# Patient Record
Sex: MALE | Race: BLACK OR AFRICAN AMERICAN | NOT HISPANIC OR LATINO | URBAN - METROPOLITAN AREA
[De-identification: names, ages, dates, MRNs, and addresses within clinical notes are randomized per-mention and may not be internally consistent; named-entity substitution may affect disease eponyms.]

---

## 2017-01-19 ENCOUNTER — OFFICE VISIT - HEALTHEAST (OUTPATIENT)
Dept: INTERNAL MEDICINE | Facility: CLINIC | Age: 62
End: 2017-01-19

## 2017-01-19 DIAGNOSIS — I63.9 CEREBROVASCULAR ACCIDENT (CVA), UNSPECIFIED MECHANISM (H): ICD-10-CM

## 2017-01-19 DIAGNOSIS — I10 ESSENTIAL HYPERTENSION: ICD-10-CM

## 2017-01-19 DIAGNOSIS — E11.9 DIABETES MELLITUS (H): ICD-10-CM

## 2017-01-19 DIAGNOSIS — E89.0 POSTOPERATIVE HYPOTHYROIDISM: ICD-10-CM

## 2017-01-19 LAB
CHOLEST SERPL-MCNC: 116 MG/DL
FASTING STATUS PATIENT QL REPORTED: YES
HBA1C MFR BLD: 7 % (ref 3.5–6)
HDLC SERPL-MCNC: 39 MG/DL
LDLC SERPL CALC-MCNC: 64 MG/DL
TRIGL SERPL-MCNC: 67 MG/DL

## 2017-01-19 ASSESSMENT — MIFFLIN-ST. JEOR: SCORE: 1536.49

## 2017-01-30 ENCOUNTER — OFFICE VISIT - HEALTHEAST (OUTPATIENT)
Dept: OCCUPATIONAL THERAPY | Facility: REHABILITATION | Age: 62
End: 2017-01-30

## 2017-01-30 DIAGNOSIS — M25.60 STIFFNESS IN JOINT: ICD-10-CM

## 2017-01-30 DIAGNOSIS — M79.641 PAIN IN RIGHT HAND: ICD-10-CM

## 2017-01-30 DIAGNOSIS — Z78.9 DECREASED ACTIVITIES OF DAILY LIVING (ADL): ICD-10-CM

## 2017-02-08 ENCOUNTER — COMMUNICATION - HEALTHEAST (OUTPATIENT)
Dept: INTERNAL MEDICINE | Facility: CLINIC | Age: 62
End: 2017-02-08

## 2017-02-20 ENCOUNTER — OFFICE VISIT - HEALTHEAST (OUTPATIENT)
Dept: OCCUPATIONAL THERAPY | Facility: REHABILITATION | Age: 62
End: 2017-02-20

## 2017-02-20 DIAGNOSIS — M25.60 STIFFNESS IN JOINT: ICD-10-CM

## 2017-02-20 DIAGNOSIS — M79.641 PAIN IN RIGHT HAND: ICD-10-CM

## 2017-02-20 DIAGNOSIS — Z78.9 DECREASED ACTIVITIES OF DAILY LIVING (ADL): ICD-10-CM

## 2017-03-27 ENCOUNTER — COMMUNICATION - HEALTHEAST (OUTPATIENT)
Dept: INTERNAL MEDICINE | Facility: CLINIC | Age: 62
End: 2017-03-27

## 2017-04-19 ENCOUNTER — OFFICE VISIT - HEALTHEAST (OUTPATIENT)
Dept: INTERNAL MEDICINE | Facility: CLINIC | Age: 62
End: 2017-04-19

## 2017-04-19 DIAGNOSIS — I63.9 CEREBROVASCULAR ACCIDENT (CVA), UNSPECIFIED MECHANISM (H): ICD-10-CM

## 2017-04-19 DIAGNOSIS — N40.1 BENIGN PROSTATIC HYPERPLASIA WITH LOWER URINARY TRACT SYMPTOMS, UNSPECIFIED MORPHOLOGY: ICD-10-CM

## 2017-04-19 DIAGNOSIS — E11.9 TYPE 2 DIABETES MELLITUS WITHOUT COMPLICATION, WITHOUT LONG-TERM CURRENT USE OF INSULIN (H): ICD-10-CM

## 2017-04-19 DIAGNOSIS — E89.0 POSTOPERATIVE HYPOTHYROIDISM: ICD-10-CM

## 2017-04-19 DIAGNOSIS — I10 ESSENTIAL HYPERTENSION: ICD-10-CM

## 2017-04-19 LAB — HBA1C MFR BLD: 6.7 % (ref 3.5–6)

## 2017-04-19 ASSESSMENT — MIFFLIN-ST. JEOR: SCORE: 1562.58

## 2017-05-02 ENCOUNTER — COMMUNICATION - HEALTHEAST (OUTPATIENT)
Dept: INTERNAL MEDICINE | Facility: CLINIC | Age: 62
End: 2017-05-02

## 2017-05-02 ENCOUNTER — COMMUNICATION - HEALTHEAST (OUTPATIENT)
Dept: SCHEDULING | Facility: CLINIC | Age: 62
End: 2017-05-02

## 2017-05-02 DIAGNOSIS — J06.9 URI (UPPER RESPIRATORY INFECTION): ICD-10-CM

## 2017-07-20 ENCOUNTER — OFFICE VISIT - HEALTHEAST (OUTPATIENT)
Dept: INTERNAL MEDICINE | Facility: CLINIC | Age: 62
End: 2017-07-20

## 2017-07-20 ENCOUNTER — RECORDS - HEALTHEAST (OUTPATIENT)
Dept: GENERAL RADIOLOGY | Facility: CLINIC | Age: 62
End: 2017-07-20

## 2017-07-20 DIAGNOSIS — I63.9 CEREBROVASCULAR ACCIDENT (CVA), UNSPECIFIED MECHANISM (H): ICD-10-CM

## 2017-07-20 DIAGNOSIS — E11.9 TYPE 2 DIABETES MELLITUS WITHOUT COMPLICATION, WITHOUT LONG-TERM CURRENT USE OF INSULIN (H): ICD-10-CM

## 2017-07-20 DIAGNOSIS — E89.0 POSTOPERATIVE HYPOTHYROIDISM: ICD-10-CM

## 2017-07-20 DIAGNOSIS — R20.0 NUMBNESS OF RIGHT HAND: ICD-10-CM

## 2017-07-20 DIAGNOSIS — M54.2 CERVICALGIA: ICD-10-CM

## 2017-07-20 DIAGNOSIS — M54.2 NECK PAIN: ICD-10-CM

## 2017-07-20 DIAGNOSIS — M25.521 RIGHT ELBOW PAIN: ICD-10-CM

## 2017-07-20 DIAGNOSIS — I10 ESSENTIAL HYPERTENSION: ICD-10-CM

## 2017-07-20 DIAGNOSIS — M25.521 PAIN IN RIGHT ELBOW: ICD-10-CM

## 2017-07-20 LAB
CREAT SERPL-MCNC: 0.99 MG/DL (ref 0.7–1.3)
GFR SERPL CREATININE-BSD FRML MDRD: >60 ML/MIN/1.73M2
HBA1C MFR BLD: 6.7 % (ref 3.5–6)

## 2017-07-20 ASSESSMENT — MIFFLIN-ST. JEOR: SCORE: 1550.1

## 2017-07-28 ENCOUNTER — COMMUNICATION - HEALTHEAST (OUTPATIENT)
Dept: INTERNAL MEDICINE | Facility: CLINIC | Age: 62
End: 2017-07-28

## 2017-08-02 ENCOUNTER — HOSPITAL ENCOUNTER (OUTPATIENT)
Dept: PHYSICAL MEDICINE AND REHAB | Facility: CLINIC | Age: 62
Discharge: HOME | End: 2017-08-02
Attending: INTERNAL MEDICINE

## 2017-08-02 DIAGNOSIS — M25.521 RIGHT ELBOW PAIN: ICD-10-CM

## 2017-08-02 DIAGNOSIS — R20.0 NUMBNESS OF RIGHT HAND: ICD-10-CM

## 2017-08-02 DIAGNOSIS — M54.2 NECK PAIN: ICD-10-CM

## 2017-08-03 ENCOUNTER — OFFICE VISIT - HEALTHEAST (OUTPATIENT)
Dept: INTERNAL MEDICINE | Facility: CLINIC | Age: 62
End: 2017-08-03

## 2017-08-03 DIAGNOSIS — M54.2 NECK PAIN: ICD-10-CM

## 2017-08-03 DIAGNOSIS — M79.601 RIGHT ARM PAIN: ICD-10-CM

## 2017-08-03 DIAGNOSIS — E11.9 TYPE 2 DIABETES MELLITUS WITHOUT COMPLICATION, WITHOUT LONG-TERM CURRENT USE OF INSULIN (H): ICD-10-CM

## 2017-08-03 DIAGNOSIS — I63.9 CEREBROVASCULAR ACCIDENT (CVA), UNSPECIFIED MECHANISM (H): ICD-10-CM

## 2017-08-03 DIAGNOSIS — M48.02 CERVICAL SPINAL STENOSIS: ICD-10-CM

## 2017-08-03 DIAGNOSIS — I10 ESSENTIAL HYPERTENSION: ICD-10-CM

## 2017-08-03 ASSESSMENT — MIFFLIN-ST. JEOR: SCORE: 1545.57

## 2017-08-17 ENCOUNTER — HOSPITAL ENCOUNTER (OUTPATIENT)
Dept: MRI IMAGING | Facility: CLINIC | Age: 62
Discharge: HOME | End: 2017-08-17
Attending: INTERNAL MEDICINE

## 2017-08-17 DIAGNOSIS — M54.2 NECK PAIN: ICD-10-CM

## 2017-08-17 DIAGNOSIS — M79.601 RIGHT ARM PAIN: ICD-10-CM

## 2017-08-18 ENCOUNTER — COMMUNICATION - HEALTHEAST (OUTPATIENT)
Dept: INTERNAL MEDICINE | Facility: CLINIC | Age: 62
End: 2017-08-18

## 2017-08-31 ENCOUNTER — OFFICE VISIT - HEALTHEAST (OUTPATIENT)
Dept: NEUROSURGERY | Facility: CLINIC | Age: 62
End: 2017-08-31

## 2017-08-31 DIAGNOSIS — G54.2 CERVICAL NERVE ROOT COMPRESSION: ICD-10-CM

## 2017-08-31 ASSESSMENT — MIFFLIN-ST. JEOR: SCORE: 1545.57

## 2017-09-14 ENCOUNTER — HOSPITAL ENCOUNTER (OUTPATIENT)
Dept: PHYSICAL MEDICINE AND REHAB | Facility: CLINIC | Age: 62
Discharge: HOME | End: 2017-09-14
Attending: NEUROLOGICAL SURGERY

## 2017-09-14 DIAGNOSIS — G54.2 CERVICAL NERVE ROOT COMPRESSION: ICD-10-CM

## 2017-09-14 DIAGNOSIS — M54.12 RADICULITIS OF LEFT CERVICAL REGION: ICD-10-CM

## 2017-09-14 DIAGNOSIS — E11.9 TYPE 2 DIABETES MELLITUS WITHOUT COMPLICATION, WITHOUT LONG-TERM CURRENT USE OF INSULIN (H): ICD-10-CM

## 2017-09-14 DIAGNOSIS — R20.0 NUMBNESS OF RIGHT HAND: ICD-10-CM

## 2017-09-15 ENCOUNTER — COMMUNICATION - HEALTHEAST (OUTPATIENT)
Dept: PHYSICAL MEDICINE AND REHAB | Facility: CLINIC | Age: 62
End: 2017-09-15

## 2017-09-19 ENCOUNTER — OFFICE VISIT - HEALTHEAST (OUTPATIENT)
Dept: PHYSICAL THERAPY | Facility: REHABILITATION | Age: 62
End: 2017-09-19

## 2017-09-19 DIAGNOSIS — R29.3 ABNORMAL POSTURE: ICD-10-CM

## 2017-09-19 DIAGNOSIS — M54.12 LEFT CERVICAL RADICULOPATHY: ICD-10-CM

## 2017-09-19 DIAGNOSIS — R29.898 DECREASED ROM OF NECK: ICD-10-CM

## 2017-09-19 DIAGNOSIS — M54.12 RIGHT CERVICAL RADICULOPATHY: ICD-10-CM

## 2017-09-21 ENCOUNTER — OFFICE VISIT - HEALTHEAST (OUTPATIENT)
Dept: PHYSICAL THERAPY | Facility: REHABILITATION | Age: 62
End: 2017-09-21

## 2017-09-21 DIAGNOSIS — M54.12 LEFT CERVICAL RADICULOPATHY: ICD-10-CM

## 2017-09-21 DIAGNOSIS — M54.12 RIGHT CERVICAL RADICULOPATHY: ICD-10-CM

## 2017-09-21 DIAGNOSIS — R29.3 ABNORMAL POSTURE: ICD-10-CM

## 2017-09-21 DIAGNOSIS — R29.898 DECREASED ROM OF NECK: ICD-10-CM

## 2017-09-25 ENCOUNTER — OFFICE VISIT - HEALTHEAST (OUTPATIENT)
Dept: PHYSICAL THERAPY | Facility: REHABILITATION | Age: 62
End: 2017-09-25

## 2017-09-25 ENCOUNTER — COMMUNICATION - HEALTHEAST (OUTPATIENT)
Dept: PHYSICAL MEDICINE AND REHAB | Facility: CLINIC | Age: 62
End: 2017-09-25

## 2017-09-25 DIAGNOSIS — R29.3 ABNORMAL POSTURE: ICD-10-CM

## 2017-09-25 DIAGNOSIS — M54.12 RIGHT CERVICAL RADICULOPATHY: ICD-10-CM

## 2017-09-25 DIAGNOSIS — R29.898 DECREASED ROM OF NECK: ICD-10-CM

## 2017-09-25 DIAGNOSIS — M54.12 LEFT CERVICAL RADICULOPATHY: ICD-10-CM

## 2017-09-28 ENCOUNTER — OFFICE VISIT - HEALTHEAST (OUTPATIENT)
Dept: PHYSICAL THERAPY | Facility: REHABILITATION | Age: 62
End: 2017-09-28

## 2017-09-28 DIAGNOSIS — R29.3 ABNORMAL POSTURE: ICD-10-CM

## 2017-09-28 DIAGNOSIS — R29.898 DECREASED ROM OF NECK: ICD-10-CM

## 2017-09-28 DIAGNOSIS — M54.12 RIGHT CERVICAL RADICULOPATHY: ICD-10-CM

## 2017-09-28 DIAGNOSIS — M54.12 LEFT CERVICAL RADICULOPATHY: ICD-10-CM

## 2017-10-02 ENCOUNTER — OFFICE VISIT - HEALTHEAST (OUTPATIENT)
Dept: PHYSICAL THERAPY | Facility: REHABILITATION | Age: 62
End: 2017-10-02

## 2017-10-02 DIAGNOSIS — R29.3 ABNORMAL POSTURE: ICD-10-CM

## 2017-10-02 DIAGNOSIS — M54.12 RIGHT CERVICAL RADICULOPATHY: ICD-10-CM

## 2017-10-02 DIAGNOSIS — M54.12 LEFT CERVICAL RADICULOPATHY: ICD-10-CM

## 2017-10-02 DIAGNOSIS — R29.898 DECREASED ROM OF NECK: ICD-10-CM

## 2017-10-05 ENCOUNTER — OFFICE VISIT - HEALTHEAST (OUTPATIENT)
Dept: PHYSICAL THERAPY | Facility: REHABILITATION | Age: 62
End: 2017-10-05

## 2017-10-05 DIAGNOSIS — M54.12 RIGHT CERVICAL RADICULOPATHY: ICD-10-CM

## 2017-10-05 DIAGNOSIS — R29.898 DECREASED ROM OF NECK: ICD-10-CM

## 2017-10-05 DIAGNOSIS — R29.3 ABNORMAL POSTURE: ICD-10-CM

## 2017-10-09 ENCOUNTER — OFFICE VISIT - HEALTHEAST (OUTPATIENT)
Dept: PHYSICAL THERAPY | Facility: REHABILITATION | Age: 62
End: 2017-10-09

## 2017-10-09 DIAGNOSIS — M54.12 LEFT CERVICAL RADICULOPATHY: ICD-10-CM

## 2017-10-09 DIAGNOSIS — M54.12 RIGHT CERVICAL RADICULOPATHY: ICD-10-CM

## 2017-10-09 DIAGNOSIS — R29.898 DECREASED ROM OF NECK: ICD-10-CM

## 2017-10-09 DIAGNOSIS — R29.3 ABNORMAL POSTURE: ICD-10-CM

## 2017-10-16 ENCOUNTER — OFFICE VISIT - HEALTHEAST (OUTPATIENT)
Dept: PHYSICAL THERAPY | Facility: REHABILITATION | Age: 62
End: 2017-10-16

## 2017-10-16 DIAGNOSIS — M54.12 LEFT CERVICAL RADICULOPATHY: ICD-10-CM

## 2017-10-16 DIAGNOSIS — R29.3 ABNORMAL POSTURE: ICD-10-CM

## 2017-10-16 DIAGNOSIS — M54.12 RIGHT CERVICAL RADICULOPATHY: ICD-10-CM

## 2017-10-16 DIAGNOSIS — R29.898 DECREASED ROM OF NECK: ICD-10-CM

## 2017-10-19 ENCOUNTER — COMMUNICATION - HEALTHEAST (OUTPATIENT)
Dept: PHYSICAL THERAPY | Facility: REHABILITATION | Age: 62
End: 2017-10-19

## 2017-10-23 ENCOUNTER — OFFICE VISIT - HEALTHEAST (OUTPATIENT)
Dept: PHYSICAL THERAPY | Facility: REHABILITATION | Age: 62
End: 2017-10-23

## 2017-10-23 DIAGNOSIS — R29.898 DECREASED ROM OF NECK: ICD-10-CM

## 2017-10-23 DIAGNOSIS — R29.3 ABNORMAL POSTURE: ICD-10-CM

## 2017-10-23 DIAGNOSIS — M54.12 LEFT CERVICAL RADICULOPATHY: ICD-10-CM

## 2017-10-23 DIAGNOSIS — M54.12 RIGHT CERVICAL RADICULOPATHY: ICD-10-CM

## 2017-10-24 ENCOUNTER — OFFICE VISIT - HEALTHEAST (OUTPATIENT)
Dept: INTERNAL MEDICINE | Facility: CLINIC | Age: 62
End: 2017-10-24

## 2017-10-24 DIAGNOSIS — I63.9 CEREBROVASCULAR ACCIDENT (CVA), UNSPECIFIED MECHANISM (H): ICD-10-CM

## 2017-10-24 DIAGNOSIS — E89.0 POSTOPERATIVE HYPOTHYROIDISM: ICD-10-CM

## 2017-10-24 DIAGNOSIS — I10 ESSENTIAL HYPERTENSION: ICD-10-CM

## 2017-10-24 DIAGNOSIS — Z12.11 SCREEN FOR COLON CANCER: ICD-10-CM

## 2017-10-24 DIAGNOSIS — E11.9 TYPE 2 DIABETES MELLITUS WITHOUT COMPLICATION, WITHOUT LONG-TERM CURRENT USE OF INSULIN (H): ICD-10-CM

## 2017-10-24 DIAGNOSIS — Z23 NEED FOR PROPHYLACTIC VACCINATION AND INOCULATION AGAINST INFLUENZA: ICD-10-CM

## 2017-10-24 LAB
CREAT SERPL-MCNC: 0.96 MG/DL (ref 0.7–1.3)
GFR SERPL CREATININE-BSD FRML MDRD: >60 ML/MIN/1.73M2
HBA1C MFR BLD: 6.8 % (ref 3.5–6)

## 2017-10-24 ASSESSMENT — MIFFLIN-ST. JEOR: SCORE: 1545.57

## 2017-10-26 ENCOUNTER — OFFICE VISIT - HEALTHEAST (OUTPATIENT)
Dept: PHYSICAL THERAPY | Facility: REHABILITATION | Age: 62
End: 2017-10-26

## 2017-10-26 DIAGNOSIS — R29.3 ABNORMAL POSTURE: ICD-10-CM

## 2017-10-26 DIAGNOSIS — R29.898 DECREASED ROM OF NECK: ICD-10-CM

## 2017-10-26 DIAGNOSIS — M54.12 LEFT CERVICAL RADICULOPATHY: ICD-10-CM

## 2017-10-26 DIAGNOSIS — M54.12 RIGHT CERVICAL RADICULOPATHY: ICD-10-CM

## 2017-11-02 ENCOUNTER — COMMUNICATION - HEALTHEAST (OUTPATIENT)
Dept: PHYSICAL THERAPY | Facility: REHABILITATION | Age: 62
End: 2017-11-02

## 2017-11-03 ENCOUNTER — AMBULATORY - HEALTHEAST (OUTPATIENT)
Dept: INTERNAL MEDICINE | Facility: CLINIC | Age: 62
End: 2017-11-03

## 2017-11-13 ENCOUNTER — COMMUNICATION - HEALTHEAST (OUTPATIENT)
Dept: INTERNAL MEDICINE | Facility: CLINIC | Age: 62
End: 2017-11-13

## 2017-12-05 ENCOUNTER — COMMUNICATION - HEALTHEAST (OUTPATIENT)
Dept: SCHEDULING | Facility: CLINIC | Age: 62
End: 2017-12-05

## 2017-12-11 ENCOUNTER — RECORDS - HEALTHEAST (OUTPATIENT)
Dept: ADMINISTRATIVE | Facility: OTHER | Age: 62
End: 2017-12-11

## 2017-12-19 ENCOUNTER — COMMUNICATION - HEALTHEAST (OUTPATIENT)
Dept: SCHEDULING | Facility: CLINIC | Age: 62
End: 2017-12-19

## 2018-01-25 ENCOUNTER — OFFICE VISIT - HEALTHEAST (OUTPATIENT)
Dept: INTERNAL MEDICINE | Facility: CLINIC | Age: 63
End: 2018-01-25

## 2018-01-25 DIAGNOSIS — E11.9 TYPE 2 DIABETES MELLITUS WITHOUT COMPLICATION, WITHOUT LONG-TERM CURRENT USE OF INSULIN (H): ICD-10-CM

## 2018-01-25 DIAGNOSIS — I63.9 CEREBROVASCULAR ACCIDENT (CVA), UNSPECIFIED MECHANISM (H): ICD-10-CM

## 2018-01-25 DIAGNOSIS — N40.1 BENIGN PROSTATIC HYPERPLASIA WITH URINARY FREQUENCY: ICD-10-CM

## 2018-01-25 DIAGNOSIS — E89.0 POSTOPERATIVE HYPOTHYROIDISM: ICD-10-CM

## 2018-01-25 DIAGNOSIS — R35.0 BENIGN PROSTATIC HYPERPLASIA WITH URINARY FREQUENCY: ICD-10-CM

## 2018-01-25 DIAGNOSIS — I10 ESSENTIAL HYPERTENSION: ICD-10-CM

## 2018-01-25 LAB
CHOLEST SERPL-MCNC: 119 MG/DL
CREAT SERPL-MCNC: 0.93 MG/DL (ref 0.7–1.3)
FASTING STATUS PATIENT QL REPORTED: YES
GFR SERPL CREATININE-BSD FRML MDRD: >60 ML/MIN/1.73M2
HBA1C MFR BLD: 6.6 % (ref 3.5–6)
HDLC SERPL-MCNC: 46 MG/DL
LDLC SERPL CALC-MCNC: 59 MG/DL
POTASSIUM BLD-SCNC: 4 MMOL/L (ref 3.5–5)
TRIGL SERPL-MCNC: 72 MG/DL
TSH SERPL DL<=0.005 MIU/L-ACNC: 2.36 UIU/ML (ref 0.3–5)

## 2018-01-25 ASSESSMENT — MIFFLIN-ST. JEOR: SCORE: 1554.64

## 2018-03-12 ENCOUNTER — AMBULATORY - HEALTHEAST (OUTPATIENT)
Dept: INTERNAL MEDICINE | Facility: CLINIC | Age: 63
End: 2018-03-12

## 2018-03-12 ENCOUNTER — COMMUNICATION - HEALTHEAST (OUTPATIENT)
Dept: INTERNAL MEDICINE | Facility: CLINIC | Age: 63
End: 2018-03-12

## 2018-04-25 ENCOUNTER — OFFICE VISIT - HEALTHEAST (OUTPATIENT)
Dept: INTERNAL MEDICINE | Facility: CLINIC | Age: 63
End: 2018-04-25

## 2018-04-25 DIAGNOSIS — E89.0 POSTOPERATIVE HYPOTHYROIDISM: ICD-10-CM

## 2018-04-25 DIAGNOSIS — R35.0 BENIGN PROSTATIC HYPERPLASIA WITH URINARY FREQUENCY: ICD-10-CM

## 2018-04-25 DIAGNOSIS — D17.0 LIPOMA OF NECK: ICD-10-CM

## 2018-04-25 DIAGNOSIS — J31.0 RHINITIS, UNSPECIFIED CHRONICITY, UNSPECIFIED TYPE: ICD-10-CM

## 2018-04-25 DIAGNOSIS — I63.9 CEREBROVASCULAR ACCIDENT (CVA), UNSPECIFIED MECHANISM (H): ICD-10-CM

## 2018-04-25 DIAGNOSIS — I10 ESSENTIAL HYPERTENSION: ICD-10-CM

## 2018-04-25 DIAGNOSIS — N40.1 BENIGN PROSTATIC HYPERPLASIA WITH URINARY FREQUENCY: ICD-10-CM

## 2018-04-25 DIAGNOSIS — E11.9 TYPE 2 DIABETES MELLITUS WITHOUT COMPLICATION, WITHOUT LONG-TERM CURRENT USE OF INSULIN (H): ICD-10-CM

## 2018-04-25 LAB — HBA1C MFR BLD: 7.1 % (ref 3.5–6)

## 2018-04-25 ASSESSMENT — MIFFLIN-ST. JEOR: SCORE: 1563.71

## 2018-05-15 ENCOUNTER — COMMUNICATION - HEALTHEAST (OUTPATIENT)
Dept: INTERNAL MEDICINE | Facility: CLINIC | Age: 63
End: 2018-05-15

## 2018-05-16 ENCOUNTER — COMMUNICATION - HEALTHEAST (OUTPATIENT)
Dept: INTERNAL MEDICINE | Facility: CLINIC | Age: 63
End: 2018-05-16

## 2018-05-16 DIAGNOSIS — I10 HTN (HYPERTENSION): ICD-10-CM

## 2018-05-23 ENCOUNTER — COMMUNICATION - HEALTHEAST (OUTPATIENT)
Dept: SCHEDULING | Facility: CLINIC | Age: 63
End: 2018-05-23

## 2018-05-23 ENCOUNTER — OFFICE VISIT - HEALTHEAST (OUTPATIENT)
Dept: INTERNAL MEDICINE | Facility: CLINIC | Age: 63
End: 2018-05-23

## 2018-05-23 DIAGNOSIS — R60.9 PITTING EDEMA: ICD-10-CM

## 2018-05-23 DIAGNOSIS — E89.0 POSTOPERATIVE HYPOTHYROIDISM: ICD-10-CM

## 2018-05-23 DIAGNOSIS — N40.1 BENIGN PROSTATIC HYPERPLASIA WITH URINARY FREQUENCY: ICD-10-CM

## 2018-05-23 DIAGNOSIS — R35.0 BENIGN PROSTATIC HYPERPLASIA WITH URINARY FREQUENCY: ICD-10-CM

## 2018-05-23 DIAGNOSIS — I10 ESSENTIAL HYPERTENSION: ICD-10-CM

## 2018-05-23 ASSESSMENT — MIFFLIN-ST. JEOR: SCORE: 1577.32

## 2018-06-19 ENCOUNTER — RECORDS - HEALTHEAST (OUTPATIENT)
Dept: ADMINISTRATIVE | Facility: OTHER | Age: 63
End: 2018-06-19

## 2018-07-11 ENCOUNTER — AMBULATORY - HEALTHEAST (OUTPATIENT)
Dept: INTERNAL MEDICINE | Facility: CLINIC | Age: 63
End: 2018-07-11

## 2018-07-31 ENCOUNTER — OFFICE VISIT - HEALTHEAST (OUTPATIENT)
Dept: INTERNAL MEDICINE | Facility: CLINIC | Age: 63
End: 2018-07-31

## 2018-07-31 DIAGNOSIS — I63.9 CEREBROVASCULAR ACCIDENT (CVA), UNSPECIFIED MECHANISM (H): ICD-10-CM

## 2018-07-31 DIAGNOSIS — D64.9 ANEMIA, UNSPECIFIED TYPE: ICD-10-CM

## 2018-07-31 DIAGNOSIS — E89.0 POSTOPERATIVE HYPOTHYROIDISM: ICD-10-CM

## 2018-07-31 DIAGNOSIS — Z12.11 ENCOUNTER FOR SCREENING COLONOSCOPY: ICD-10-CM

## 2018-07-31 DIAGNOSIS — I10 ESSENTIAL HYPERTENSION: ICD-10-CM

## 2018-07-31 DIAGNOSIS — E11.59 TYPE 2 DIABETES MELLITUS WITH CIRCULATORY DISORDER, WITHOUT LONG-TERM CURRENT USE OF INSULIN (H): ICD-10-CM

## 2018-07-31 LAB
ANION GAP SERPL CALCULATED.3IONS-SCNC: 12 MMOL/L (ref 5–18)
BUN SERPL-MCNC: 13 MG/DL (ref 8–22)
CALCIUM SERPL-MCNC: 9.7 MG/DL (ref 8.5–10.5)
CHLORIDE BLD-SCNC: 103 MMOL/L (ref 98–107)
CO2 SERPL-SCNC: 25 MMOL/L (ref 22–31)
CREAT SERPL-MCNC: 0.95 MG/DL (ref 0.7–1.3)
ERYTHROCYTE [DISTWIDTH] IN BLOOD BY AUTOMATED COUNT: 13.5 % (ref 11–14.5)
FERRITIN SERPL-MCNC: 128 NG/ML (ref 27–300)
FOLATE SERPL-MCNC: 8.5 NG/ML
GFR SERPL CREATININE-BSD FRML MDRD: >60 ML/MIN/1.73M2
GLUCOSE BLD-MCNC: 113 MG/DL (ref 70–125)
HBA1C MFR BLD: 6.9 % (ref 3.5–6)
HCT VFR BLD AUTO: 39.4 % (ref 40–54)
HGB BLD-MCNC: 12.9 G/DL (ref 14–18)
MCH RBC QN AUTO: 27.9 PG (ref 27–34)
MCHC RBC AUTO-ENTMCNC: 32.7 G/DL (ref 32–36)
MCV RBC AUTO: 85 FL (ref 80–100)
PLATELET # BLD AUTO: 257 THOU/UL (ref 140–440)
PMV BLD AUTO: 7.4 FL (ref 7–10)
POTASSIUM BLD-SCNC: 3.9 MMOL/L (ref 3.5–5)
RBC # BLD AUTO: 4.62 MILL/UL (ref 4.4–6.2)
SODIUM SERPL-SCNC: 140 MMOL/L (ref 136–145)
TSH SERPL DL<=0.005 MIU/L-ACNC: 0.62 UIU/ML (ref 0.3–5)
VIT B12 SERPL-MCNC: 739 PG/ML (ref 213–816)
WBC: 5.9 THOU/UL (ref 4–11)

## 2018-07-31 ASSESSMENT — MIFFLIN-ST. JEOR: SCORE: 1550.1

## 2018-08-28 ENCOUNTER — COMMUNICATION - HEALTHEAST (OUTPATIENT)
Dept: INTERNAL MEDICINE | Facility: CLINIC | Age: 63
End: 2018-08-28

## 2018-10-31 ENCOUNTER — OFFICE VISIT - HEALTHEAST (OUTPATIENT)
Dept: INTERNAL MEDICINE | Facility: CLINIC | Age: 63
End: 2018-10-31

## 2018-10-31 ENCOUNTER — RECORDS - HEALTHEAST (OUTPATIENT)
Dept: GENERAL RADIOLOGY | Facility: CLINIC | Age: 63
End: 2018-10-31

## 2018-10-31 DIAGNOSIS — E11.59 TYPE 2 DIABETES MELLITUS WITH CIRCULATORY DISORDER, WITHOUT LONG-TERM CURRENT USE OF INSULIN (H): ICD-10-CM

## 2018-10-31 DIAGNOSIS — M25.562 LEFT KNEE PAIN: ICD-10-CM

## 2018-10-31 DIAGNOSIS — R35.0 BENIGN PROSTATIC HYPERPLASIA WITH URINARY FREQUENCY: ICD-10-CM

## 2018-10-31 DIAGNOSIS — M25.562 PAIN IN LEFT KNEE: ICD-10-CM

## 2018-10-31 DIAGNOSIS — Z23 NEED FOR PROPHYLACTIC VACCINATION AND INOCULATION AGAINST INFLUENZA: ICD-10-CM

## 2018-10-31 DIAGNOSIS — I10 ESSENTIAL HYPERTENSION: ICD-10-CM

## 2018-10-31 DIAGNOSIS — E89.0 POSTOPERATIVE HYPOTHYROIDISM: ICD-10-CM

## 2018-10-31 DIAGNOSIS — D64.9 ANEMIA: ICD-10-CM

## 2018-10-31 DIAGNOSIS — Z12.5 SCREENING FOR PROSTATE CANCER: ICD-10-CM

## 2018-10-31 DIAGNOSIS — N40.1 BENIGN PROSTATIC HYPERPLASIA WITH URINARY FREQUENCY: ICD-10-CM

## 2018-10-31 LAB
ANION GAP SERPL CALCULATED.3IONS-SCNC: 12 MMOL/L (ref 5–18)
BUN SERPL-MCNC: 14 MG/DL (ref 8–22)
CALCIUM SERPL-MCNC: 9.6 MG/DL (ref 8.5–10.5)
CHLORIDE BLD-SCNC: 102 MMOL/L (ref 98–107)
CO2 SERPL-SCNC: 25 MMOL/L (ref 22–31)
CREAT SERPL-MCNC: 1.04 MG/DL (ref 0.7–1.3)
ERYTHROCYTE [DISTWIDTH] IN BLOOD BY AUTOMATED COUNT: 13.5 % (ref 11–14.5)
GFR SERPL CREATININE-BSD FRML MDRD: >60 ML/MIN/1.73M2
GLUCOSE BLD-MCNC: 86 MG/DL (ref 70–125)
HBA1C MFR BLD: 6.9 % (ref 3.5–6)
HCT VFR BLD AUTO: 36.8 % (ref 40–54)
HGB BLD-MCNC: 12.3 G/DL (ref 14–18)
MCH RBC QN AUTO: 28.3 PG (ref 27–34)
MCHC RBC AUTO-ENTMCNC: 33.4 G/DL (ref 32–36)
MCV RBC AUTO: 85 FL (ref 80–100)
PLATELET # BLD AUTO: 247 THOU/UL (ref 140–440)
PMV BLD AUTO: 7.8 FL (ref 7–10)
POTASSIUM BLD-SCNC: 3.6 MMOL/L (ref 3.5–5)
PSA SERPL-MCNC: 5 NG/ML (ref 0–4.5)
RBC # BLD AUTO: 4.35 MILL/UL (ref 4.4–6.2)
SODIUM SERPL-SCNC: 139 MMOL/L (ref 136–145)
WBC: 5.8 THOU/UL (ref 4–11)

## 2018-10-31 RX ORDER — SENNOSIDES 8.6 MG
1300 CAPSULE ORAL EVERY 8 HOURS PRN
Qty: 180 TABLET | Refills: 11 | Status: SHIPPED | OUTPATIENT
Start: 2018-10-31

## 2018-10-31 ASSESSMENT — MIFFLIN-ST. JEOR: SCORE: 1550.1

## 2018-11-14 ENCOUNTER — COMMUNICATION - HEALTHEAST (OUTPATIENT)
Dept: INTERNAL MEDICINE | Facility: CLINIC | Age: 63
End: 2018-11-14

## 2018-11-14 DIAGNOSIS — E89.0 POSTOPERATIVE HYPOTHYROIDISM: ICD-10-CM

## 2018-11-15 ENCOUNTER — COMMUNICATION - HEALTHEAST (OUTPATIENT)
Dept: INTERNAL MEDICINE | Facility: CLINIC | Age: 63
End: 2018-11-15

## 2018-11-16 RX ORDER — TRIAMCINOLONE ACETONIDE 1 MG/G
CREAM TOPICAL
Qty: 30 G | Refills: 2 | Status: SHIPPED | OUTPATIENT
Start: 2018-11-16

## 2018-12-20 ENCOUNTER — OFFICE VISIT - HEALTHEAST (OUTPATIENT)
Dept: INTERNAL MEDICINE | Facility: CLINIC | Age: 63
End: 2018-12-20

## 2018-12-20 DIAGNOSIS — M13.162 INFLAMMATION OF JOINT OF LEFT KNEE: ICD-10-CM

## 2018-12-20 DIAGNOSIS — E11.59 TYPE 2 DIABETES MELLITUS WITH OTHER CIRCULATORY COMPLICATION, WITHOUT LONG-TERM CURRENT USE OF INSULIN (H): ICD-10-CM

## 2018-12-20 DIAGNOSIS — I10 ESSENTIAL HYPERTENSION: ICD-10-CM

## 2018-12-20 LAB
C REACTIVE PROTEIN LHE: <0.1 MG/DL (ref 0–0.8)
ERYTHROCYTE [SEDIMENTATION RATE] IN BLOOD BY WESTERGREN METHOD: 16 MM/HR (ref 0–15)
URATE SERPL-MCNC: 6 MG/DL (ref 3–8)

## 2019-01-02 ENCOUNTER — COMMUNICATION - HEALTHEAST (OUTPATIENT)
Dept: INTERNAL MEDICINE | Facility: CLINIC | Age: 64
End: 2019-01-02

## 2019-01-05 ENCOUNTER — COMMUNICATION - HEALTHEAST (OUTPATIENT)
Dept: INTERNAL MEDICINE | Facility: CLINIC | Age: 64
End: 2019-01-05

## 2019-01-06 RX ORDER — POLYVINYL ALCOHOL 14 MG/ML
SOLUTION/ DROPS OPHTHALMIC
Qty: 15 ML | Refills: 10 | Status: SHIPPED | OUTPATIENT
Start: 2019-01-06

## 2019-02-10 ENCOUNTER — COMMUNICATION - HEALTHEAST (OUTPATIENT)
Dept: INTERNAL MEDICINE | Facility: CLINIC | Age: 64
End: 2019-02-10

## 2019-03-13 ENCOUNTER — COMMUNICATION - HEALTHEAST (OUTPATIENT)
Dept: INTERNAL MEDICINE | Facility: CLINIC | Age: 64
End: 2019-03-13

## 2019-03-13 DIAGNOSIS — I10 HTN (HYPERTENSION): ICD-10-CM

## 2019-03-21 ENCOUNTER — OFFICE VISIT - HEALTHEAST (OUTPATIENT)
Dept: INTERNAL MEDICINE | Facility: CLINIC | Age: 64
End: 2019-03-21

## 2019-03-21 DIAGNOSIS — I63.9 CEREBROVASCULAR ACCIDENT (CVA), UNSPECIFIED MECHANISM (H): ICD-10-CM

## 2019-03-21 DIAGNOSIS — E11.59 TYPE 2 DIABETES MELLITUS WITH OTHER CIRCULATORY COMPLICATION, WITHOUT LONG-TERM CURRENT USE OF INSULIN (H): ICD-10-CM

## 2019-03-21 DIAGNOSIS — I10 ESSENTIAL HYPERTENSION: ICD-10-CM

## 2019-03-21 DIAGNOSIS — Z71.89 ADVANCE CARE PLANNING: ICD-10-CM

## 2019-03-21 DIAGNOSIS — R35.0 BENIGN PROSTATIC HYPERPLASIA WITH URINARY FREQUENCY: ICD-10-CM

## 2019-03-21 DIAGNOSIS — Z00.00 ROUTINE GENERAL MEDICAL EXAMINATION AT A HEALTH CARE FACILITY: ICD-10-CM

## 2019-03-21 DIAGNOSIS — N40.1 BENIGN PROSTATIC HYPERPLASIA WITH URINARY FREQUENCY: ICD-10-CM

## 2019-03-21 DIAGNOSIS — Z12.5 SCREENING FOR PROSTATE CANCER: ICD-10-CM

## 2019-03-21 DIAGNOSIS — E89.0 POSTOPERATIVE HYPOTHYROIDISM: ICD-10-CM

## 2019-03-21 DIAGNOSIS — D64.9 ANEMIA, UNSPECIFIED TYPE: ICD-10-CM

## 2019-03-21 DIAGNOSIS — G89.29 CHRONIC PAIN OF LEFT KNEE: ICD-10-CM

## 2019-03-21 DIAGNOSIS — M25.562 CHRONIC PAIN OF LEFT KNEE: ICD-10-CM

## 2019-03-21 LAB
ALBUMIN SERPL-MCNC: 4.4 G/DL (ref 3.5–5)
ALP SERPL-CCNC: 76 U/L (ref 45–120)
ALT SERPL W P-5'-P-CCNC: 11 U/L (ref 0–45)
ANION GAP SERPL CALCULATED.3IONS-SCNC: 13 MMOL/L (ref 5–18)
AST SERPL W P-5'-P-CCNC: 15 U/L (ref 0–40)
BILIRUB SERPL-MCNC: 0.5 MG/DL (ref 0–1)
BUN SERPL-MCNC: 14 MG/DL (ref 8–22)
CALCIUM SERPL-MCNC: 10.1 MG/DL (ref 8.5–10.5)
CHLORIDE BLD-SCNC: 106 MMOL/L (ref 98–107)
CHOLEST SERPL-MCNC: 106 MG/DL
CO2 SERPL-SCNC: 24 MMOL/L (ref 22–31)
CREAT SERPL-MCNC: 1.04 MG/DL (ref 0.7–1.3)
ERYTHROCYTE [DISTWIDTH] IN BLOOD BY AUTOMATED COUNT: 14.3 % (ref 11–14.5)
FASTING STATUS PATIENT QL REPORTED: ABNORMAL
GFR SERPL CREATININE-BSD FRML MDRD: >60 ML/MIN/1.73M2
GLUCOSE BLD-MCNC: 92 MG/DL (ref 70–125)
HBA1C MFR BLD: 6.8 % (ref 3.5–6)
HCT VFR BLD AUTO: 36.9 % (ref 40–54)
HDLC SERPL-MCNC: 38 MG/DL
HGB BLD-MCNC: 12.6 G/DL (ref 14–18)
LDLC SERPL CALC-MCNC: 56 MG/DL
MCH RBC QN AUTO: 28.2 PG (ref 27–34)
MCHC RBC AUTO-ENTMCNC: 34.1 G/DL (ref 32–36)
MCV RBC AUTO: 83 FL (ref 80–100)
PLATELET # BLD AUTO: 253 THOU/UL (ref 140–440)
PMV BLD AUTO: 7.7 FL (ref 7–10)
POTASSIUM BLD-SCNC: 4.4 MMOL/L (ref 3.5–5)
PROT SERPL-MCNC: 7.6 G/DL (ref 6–8)
PSA SERPL-MCNC: 4.3 NG/ML (ref 0–4.5)
RBC # BLD AUTO: 4.46 MILL/UL (ref 4.4–6.2)
SODIUM SERPL-SCNC: 143 MMOL/L (ref 136–145)
TRIGL SERPL-MCNC: 61 MG/DL
TSH SERPL DL<=0.005 MIU/L-ACNC: 0.59 UIU/ML (ref 0.3–5)
WBC: 6.4 THOU/UL (ref 4–11)

## 2019-03-21 ASSESSMENT — MIFFLIN-ST. JEOR: SCORE: 1559.17

## 2019-03-25 ENCOUNTER — RECORDS - HEALTHEAST (OUTPATIENT)
Dept: ADMINISTRATIVE | Facility: OTHER | Age: 64
End: 2019-03-25

## 2019-04-13 ENCOUNTER — COMMUNICATION - HEALTHEAST (OUTPATIENT)
Dept: INTERNAL MEDICINE | Facility: CLINIC | Age: 64
End: 2019-04-13

## 2019-04-13 DIAGNOSIS — I10 HTN (HYPERTENSION): ICD-10-CM

## 2019-05-02 ENCOUNTER — COMMUNICATION - HEALTHEAST (OUTPATIENT)
Dept: INTERNAL MEDICINE | Facility: CLINIC | Age: 64
End: 2019-05-02

## 2019-05-08 ENCOUNTER — COMMUNICATION - HEALTHEAST (OUTPATIENT)
Dept: INTERNAL MEDICINE | Facility: CLINIC | Age: 64
End: 2019-05-08

## 2019-05-08 DIAGNOSIS — I10 HTN (HYPERTENSION): ICD-10-CM

## 2019-05-31 ENCOUNTER — COMMUNICATION - HEALTHEAST (OUTPATIENT)
Dept: INTERNAL MEDICINE | Facility: CLINIC | Age: 64
End: 2019-05-31

## 2019-05-31 DIAGNOSIS — I10 HTN (HYPERTENSION): ICD-10-CM

## 2019-05-31 DIAGNOSIS — E11.59 TYPE 2 DIABETES MELLITUS WITH OTHER CIRCULATORY COMPLICATION, WITHOUT LONG-TERM CURRENT USE OF INSULIN (H): ICD-10-CM

## 2019-06-01 RX ORDER — ATORVASTATIN CALCIUM 40 MG/1
TABLET, FILM COATED ORAL
Qty: 30 TABLET | Refills: 11 | Status: SHIPPED | OUTPATIENT
Start: 2019-06-01

## 2019-06-26 ENCOUNTER — RECORDS - HEALTHEAST (OUTPATIENT)
Dept: SCHEDULING | Facility: CLINIC | Age: 64
End: 2019-06-26

## 2019-06-26 ENCOUNTER — OFFICE VISIT - HEALTHEAST (OUTPATIENT)
Dept: INTERNAL MEDICINE | Facility: CLINIC | Age: 64
End: 2019-06-26

## 2019-06-26 ENCOUNTER — COMMUNICATION - HEALTHEAST (OUTPATIENT)
Dept: INTERNAL MEDICINE | Facility: CLINIC | Age: 64
End: 2019-06-26

## 2019-06-26 DIAGNOSIS — Z23 IMMUNIZATION DUE: ICD-10-CM

## 2019-06-26 DIAGNOSIS — I10 HTN (HYPERTENSION): ICD-10-CM

## 2019-06-26 DIAGNOSIS — R35.0 BENIGN PROSTATIC HYPERPLASIA WITH URINARY FREQUENCY: ICD-10-CM

## 2019-06-26 DIAGNOSIS — E11.59 TYPE 2 DIABETES MELLITUS WITH OTHER CIRCULATORY COMPLICATION, WITHOUT LONG-TERM CURRENT USE OF INSULIN (H): ICD-10-CM

## 2019-06-26 DIAGNOSIS — N40.1 BENIGN PROSTATIC HYPERPLASIA WITH URINARY FREQUENCY: ICD-10-CM

## 2019-06-26 DIAGNOSIS — Z12.11 SCREENING FOR COLON CANCER: ICD-10-CM

## 2019-06-26 DIAGNOSIS — N52.9 ERECTILE DYSFUNCTION, UNSPECIFIED ERECTILE DYSFUNCTION TYPE: ICD-10-CM

## 2019-06-26 DIAGNOSIS — E89.0 POSTOPERATIVE HYPOTHYROIDISM: ICD-10-CM

## 2019-06-26 ASSESSMENT — MIFFLIN-ST. JEOR: SCORE: 1572.78

## 2019-07-23 ENCOUNTER — RECORDS - HEALTHEAST (OUTPATIENT)
Dept: ADMINISTRATIVE | Facility: OTHER | Age: 64
End: 2019-07-23

## 2019-07-29 ENCOUNTER — COMMUNICATION - HEALTHEAST (OUTPATIENT)
Dept: INTERNAL MEDICINE | Facility: CLINIC | Age: 64
End: 2019-07-29

## 2019-07-29 DIAGNOSIS — I10 HTN (HYPERTENSION): ICD-10-CM

## 2019-07-29 RX ORDER — LOSARTAN POTASSIUM 100 MG/1
100 TABLET ORAL AT BEDTIME
Qty: 90 TABLET | Refills: 3 | Status: SHIPPED
Start: 2019-07-29

## 2019-08-14 ENCOUNTER — RECORDS - HEALTHEAST (OUTPATIENT)
Dept: HEALTH INFORMATION MANAGEMENT | Facility: CLINIC | Age: 64
End: 2019-08-14

## 2019-08-18 ENCOUNTER — COMMUNICATION - HEALTHEAST (OUTPATIENT)
Dept: INTERNAL MEDICINE | Facility: CLINIC | Age: 64
End: 2019-08-18

## 2019-08-18 DIAGNOSIS — E11.59 TYPE 2 DIABETES MELLITUS WITH OTHER CIRCULATORY COMPLICATION, WITHOUT LONG-TERM CURRENT USE OF INSULIN (H): ICD-10-CM

## 2019-08-23 ENCOUNTER — COMMUNICATION - HEALTHEAST (OUTPATIENT)
Dept: INTERNAL MEDICINE | Facility: CLINIC | Age: 64
End: 2019-08-23

## 2019-08-23 DIAGNOSIS — E89.0 POSTOPERATIVE HYPOTHYROIDISM: ICD-10-CM

## 2019-08-23 DIAGNOSIS — E11.59 TYPE 2 DIABETES MELLITUS WITH OTHER CIRCULATORY COMPLICATION, WITHOUT LONG-TERM CURRENT USE OF INSULIN (H): ICD-10-CM

## 2019-08-27 RX ORDER — METFORMIN HYDROCHLORIDE 750 MG/1
750 TABLET, EXTENDED RELEASE ORAL
Qty: 90 TABLET | Refills: 3 | Status: SHIPPED | OUTPATIENT
Start: 2019-08-27

## 2019-09-16 ENCOUNTER — RECORDS - HEALTHEAST (OUTPATIENT)
Dept: ADMINISTRATIVE | Facility: OTHER | Age: 64
End: 2019-09-16

## 2019-09-16 LAB — COLOGUARD-ABSTRACT: NEGATIVE

## 2019-09-27 ENCOUNTER — OFFICE VISIT - HEALTHEAST (OUTPATIENT)
Dept: INTERNAL MEDICINE | Facility: CLINIC | Age: 64
End: 2019-09-27

## 2019-09-27 DIAGNOSIS — I63.9 CEREBROVASCULAR ACCIDENT (CVA), UNSPECIFIED MECHANISM (H): ICD-10-CM

## 2019-09-27 DIAGNOSIS — E89.0 POSTOPERATIVE HYPOTHYROIDISM: ICD-10-CM

## 2019-09-27 DIAGNOSIS — E11.59 TYPE 2 DIABETES MELLITUS WITH OTHER CIRCULATORY COMPLICATION, WITHOUT LONG-TERM CURRENT USE OF INSULIN (H): ICD-10-CM

## 2019-09-27 DIAGNOSIS — I10 HTN (HYPERTENSION): ICD-10-CM

## 2019-09-27 LAB
ANION GAP SERPL CALCULATED.3IONS-SCNC: 9 MMOL/L (ref 5–18)
BUN SERPL-MCNC: 12 MG/DL (ref 8–22)
CALCIUM SERPL-MCNC: 9.5 MG/DL (ref 8.5–10.5)
CHLORIDE BLD-SCNC: 104 MMOL/L (ref 98–107)
CO2 SERPL-SCNC: 26 MMOL/L (ref 22–31)
CREAT SERPL-MCNC: 1.11 MG/DL (ref 0.7–1.3)
ERYTHROCYTE [DISTWIDTH] IN BLOOD BY AUTOMATED COUNT: 12.9 % (ref 11–14.5)
GFR SERPL CREATININE-BSD FRML MDRD: >60 ML/MIN/1.73M2
GLUCOSE BLD-MCNC: 90 MG/DL (ref 70–125)
HBA1C MFR BLD: 7.1 % (ref 3.5–6)
HCT VFR BLD AUTO: 38.1 % (ref 40–54)
HGB BLD-MCNC: 12.8 G/DL (ref 14–18)
MCH RBC QN AUTO: 28.5 PG (ref 27–34)
MCHC RBC AUTO-ENTMCNC: 33.5 G/DL (ref 32–36)
MCV RBC AUTO: 85 FL (ref 80–100)
PLATELET # BLD AUTO: 281 THOU/UL (ref 140–440)
PMV BLD AUTO: 7.2 FL (ref 7–10)
POTASSIUM BLD-SCNC: 3.8 MMOL/L (ref 3.5–5)
RBC # BLD AUTO: 4.48 MILL/UL (ref 4.4–6.2)
SODIUM SERPL-SCNC: 139 MMOL/L (ref 136–145)
WBC: 5.9 THOU/UL (ref 4–11)

## 2019-09-27 RX ORDER — AMLODIPINE BESYLATE 10 MG/1
10 TABLET ORAL AT BEDTIME
Qty: 90 TABLET | Refills: 3 | Status: SHIPPED | OUTPATIENT
Start: 2019-09-27

## 2019-09-27 ASSESSMENT — MIFFLIN-ST. JEOR: SCORE: 1563.71

## 2019-09-30 ENCOUNTER — RECORDS - HEALTHEAST (OUTPATIENT)
Dept: HEALTH INFORMATION MANAGEMENT | Facility: CLINIC | Age: 64
End: 2019-09-30

## 2019-10-22 ENCOUNTER — COMMUNICATION - HEALTHEAST (OUTPATIENT)
Dept: INTERNAL MEDICINE | Facility: CLINIC | Age: 64
End: 2019-10-22

## 2019-10-22 DIAGNOSIS — E11.59 TYPE 2 DIABETES MELLITUS WITH OTHER CIRCULATORY COMPLICATION, WITHOUT LONG-TERM CURRENT USE OF INSULIN (H): ICD-10-CM

## 2019-10-29 ENCOUNTER — OFFICE VISIT - HEALTHEAST (OUTPATIENT)
Dept: INTERNAL MEDICINE | Facility: CLINIC | Age: 64
End: 2019-10-29

## 2019-10-29 DIAGNOSIS — M48.02 SPINAL STENOSIS IN CERVICAL REGION: ICD-10-CM

## 2019-10-29 DIAGNOSIS — R07.9 CHEST PAIN: ICD-10-CM

## 2019-10-29 DIAGNOSIS — E11.59 TYPE 2 DIABETES MELLITUS WITH OTHER CIRCULATORY COMPLICATION, WITHOUT LONG-TERM CURRENT USE OF INSULIN (H): ICD-10-CM

## 2019-10-29 DIAGNOSIS — I63.9 CEREBROVASCULAR ACCIDENT (CVA), UNSPECIFIED MECHANISM (H): ICD-10-CM

## 2019-10-29 DIAGNOSIS — I10 ESSENTIAL HYPERTENSION: ICD-10-CM

## 2019-10-29 DIAGNOSIS — N40.1 BENIGN PROSTATIC HYPERPLASIA WITH URINARY FREQUENCY: ICD-10-CM

## 2019-10-29 DIAGNOSIS — E89.0 POSTOPERATIVE HYPOTHYROIDISM: ICD-10-CM

## 2019-10-29 DIAGNOSIS — R35.0 BENIGN PROSTATIC HYPERPLASIA WITH URINARY FREQUENCY: ICD-10-CM

## 2019-10-29 LAB
ATRIAL RATE - MUSE: 92 BPM
DIASTOLIC BLOOD PRESSURE - MUSE: NORMAL
INTERPRETATION ECG - MUSE: NORMAL
P AXIS - MUSE: 42 DEGREES
PR INTERVAL - MUSE: 166 MS
QRS DURATION - MUSE: 80 MS
QT - MUSE: 366 MS
QTC - MUSE: 452 MS
R AXIS - MUSE: 68 DEGREES
SYSTOLIC BLOOD PRESSURE - MUSE: NORMAL
T AXIS - MUSE: 32 DEGREES
VENTRICULAR RATE- MUSE: 92 BPM

## 2019-10-29 ASSESSMENT — MIFFLIN-ST. JEOR: SCORE: 1563.71

## 2019-11-07 ENCOUNTER — HOSPITAL ENCOUNTER (OUTPATIENT)
Dept: NUCLEAR MEDICINE | Facility: CLINIC | Age: 64
Discharge: HOME | End: 2019-11-07
Attending: INTERNAL MEDICINE

## 2019-11-07 ENCOUNTER — HOSPITAL ENCOUNTER (OUTPATIENT)
Dept: CARDIOLOGY | Facility: CLINIC | Age: 64
Discharge: HOME | End: 2019-11-07
Attending: INTERNAL MEDICINE

## 2019-11-07 DIAGNOSIS — R07.9 CHEST PAIN: ICD-10-CM

## 2019-11-07 LAB
CV STRESS CURRENT BP HE: NORMAL
CV STRESS CURRENT HR HE: 102
CV STRESS CURRENT HR HE: 106
CV STRESS CURRENT HR HE: 108
CV STRESS CURRENT HR HE: 112
CV STRESS CURRENT HR HE: 112
CV STRESS CURRENT HR HE: 114
CV STRESS CURRENT HR HE: 118
CV STRESS CURRENT HR HE: 120
CV STRESS CURRENT HR HE: 123
CV STRESS CURRENT HR HE: 89
CV STRESS CURRENT HR HE: 92
CV STRESS CURRENT HR HE: 92
CV STRESS DEVIATION TIME HE: NORMAL
CV STRESS ECHO PERCENT HR HE: NORMAL
CV STRESS EXERCISE STAGE HE: NORMAL
CV STRESS FINAL RESTING BP HE: NORMAL
CV STRESS FINAL RESTING HR HE: 102
CV STRESS MAX HR HE: 126
CV STRESS MAX TREADMILL GRADE HE: 0
CV STRESS MAX TREADMILL SPEED HE: 0
CV STRESS PEAK DIA BP HE: NORMAL
CV STRESS PEAK SYS BP HE: NORMAL
CV STRESS PHASE HE: NORMAL
CV STRESS PROTOCOL HE: NORMAL
CV STRESS RESTING PT POSITION HE: NORMAL
CV STRESS ST DEVIATION AMOUNT HE: NORMAL
CV STRESS ST DEVIATION ELEVATION HE: NORMAL
CV STRESS ST EVELATION AMOUNT HE: NORMAL
CV STRESS TEST TYPE HE: NORMAL
CV STRESS TOTAL STAGE TIME MIN 1 HE: NORMAL
NUC STRESS EJECTION FRACTION: 61 %
RATE PRESSURE PRODUCT: NORMAL
STRESS ECHO BASELINE DIASTOLIC HE: 92
STRESS ECHO BASELINE HR: 87 BPM
STRESS ECHO BASELINE SYSTOLIC BP: 141
STRESS ECHO CALCULATED PERCENT HR: 81 %
STRESS ECHO LAST STRESS DIASTOLIC BP: 84
STRESS ECHO LAST STRESS HR: 112
STRESS ECHO LAST STRESS SYSTOLIC BP: 139
STRESS ECHO TARGET HR: 155

## 2019-11-26 ENCOUNTER — COMMUNICATION - HEALTHEAST (OUTPATIENT)
Dept: SCHEDULING | Facility: CLINIC | Age: 64
End: 2019-11-26

## 2019-11-27 ENCOUNTER — OFFICE VISIT - HEALTHEAST (OUTPATIENT)
Dept: INTERNAL MEDICINE | Facility: CLINIC | Age: 64
End: 2019-11-27

## 2019-11-27 ENCOUNTER — COMMUNICATION - HEALTHEAST (OUTPATIENT)
Dept: INTERNAL MEDICINE | Facility: CLINIC | Age: 64
End: 2019-11-27

## 2019-11-27 DIAGNOSIS — I10 ESSENTIAL HYPERTENSION: ICD-10-CM

## 2019-11-27 DIAGNOSIS — R05.9 COUGH: ICD-10-CM

## 2019-11-27 DIAGNOSIS — E89.0 POSTOPERATIVE HYPOTHYROIDISM: ICD-10-CM

## 2019-11-27 DIAGNOSIS — R06.02 SHORTNESS OF BREATH: ICD-10-CM

## 2019-11-27 DIAGNOSIS — I10 HTN (HYPERTENSION): ICD-10-CM

## 2019-11-27 RX ORDER — METOPROLOL SUCCINATE 50 MG/1
50 TABLET, EXTENDED RELEASE ORAL DAILY
Qty: 90 TABLET | Refills: 3 | Status: SHIPPED | OUTPATIENT
Start: 2019-11-27

## 2019-11-27 ASSESSMENT — MIFFLIN-ST. JEOR: SCORE: 1581.85

## 2019-11-28 RX ORDER — LEVOTHYROXINE SODIUM 150 UG/1
150 TABLET ORAL DAILY
Qty: 90 TABLET | Refills: 3 | Status: SHIPPED | OUTPATIENT
Start: 2019-11-28

## 2019-12-30 ENCOUNTER — COMMUNICATION - HEALTHEAST (OUTPATIENT)
Dept: INTERNAL MEDICINE | Facility: CLINIC | Age: 64
End: 2019-12-30

## 2019-12-30 ENCOUNTER — COMMUNICATION - HEALTHEAST (OUTPATIENT)
Dept: SCHEDULING | Facility: CLINIC | Age: 64
End: 2019-12-30

## 2019-12-30 DIAGNOSIS — Z00.00 ROUTINE GENERAL MEDICAL EXAMINATION AT A HEALTH CARE FACILITY: ICD-10-CM

## 2020-01-27 ENCOUNTER — COMMUNICATION - HEALTHEAST (OUTPATIENT)
Dept: INTERNAL MEDICINE | Facility: CLINIC | Age: 65
End: 2020-01-27

## 2020-01-27 DIAGNOSIS — E11.59 TYPE 2 DIABETES MELLITUS WITH OTHER CIRCULATORY COMPLICATION, WITHOUT LONG-TERM CURRENT USE OF INSULIN (H): ICD-10-CM

## 2020-01-28 RX ORDER — CHLORAL HYDRATE 500 MG
CAPSULE ORAL
Qty: 30 CAPSULE | Refills: 1 | Status: SHIPPED | OUTPATIENT
Start: 2020-01-28

## 2020-04-22 ENCOUNTER — COMMUNICATION - HEALTHEAST (OUTPATIENT)
Dept: NEUROSURGERY | Facility: CLINIC | Age: 65
End: 2020-04-22

## 2020-10-20 ENCOUNTER — RECORDS - HEALTHEAST (OUTPATIENT)
Dept: ADMINISTRATIVE | Facility: OTHER | Age: 65
End: 2020-10-20

## 2020-10-20 LAB — RETINOPATHY: NEGATIVE

## 2020-10-26 ENCOUNTER — RECORDS - HEALTHEAST (OUTPATIENT)
Dept: HEALTH INFORMATION MANAGEMENT | Facility: CLINIC | Age: 65
End: 2020-10-26

## 2020-12-09 ENCOUNTER — COMMUNICATION - HEALTHEAST (OUTPATIENT)
Dept: SCHEDULING | Facility: CLINIC | Age: 65
End: 2020-12-09

## 2020-12-10 ENCOUNTER — OFFICE VISIT - HEALTHEAST (OUTPATIENT)
Dept: FAMILY MEDICINE | Facility: CLINIC | Age: 65
End: 2020-12-10

## 2020-12-10 DIAGNOSIS — R07.89 CHEST TIGHTNESS: ICD-10-CM

## 2020-12-10 DIAGNOSIS — R05.9 COUGH: ICD-10-CM

## 2020-12-11 ENCOUNTER — OFFICE VISIT - HEALTHEAST (OUTPATIENT)
Dept: FAMILY MEDICINE | Facility: CLINIC | Age: 65
End: 2020-12-11

## 2020-12-11 DIAGNOSIS — E11.65 TYPE 2 DIABETES MELLITUS WITH HYPERGLYCEMIA, WITH LONG-TERM CURRENT USE OF INSULIN (H): ICD-10-CM

## 2020-12-11 DIAGNOSIS — Z79.4 TYPE 2 DIABETES MELLITUS WITH HYPERGLYCEMIA, WITH LONG-TERM CURRENT USE OF INSULIN (H): ICD-10-CM

## 2020-12-11 DIAGNOSIS — R05.9 COUGH: ICD-10-CM

## 2020-12-11 DIAGNOSIS — R35.0 BENIGN PROSTATIC HYPERPLASIA WITH URINARY FREQUENCY: ICD-10-CM

## 2020-12-11 DIAGNOSIS — N40.1 BENIGN PROSTATIC HYPERPLASIA WITH URINARY FREQUENCY: ICD-10-CM

## 2020-12-11 RX ORDER — TAMSULOSIN HYDROCHLORIDE 0.4 MG/1
0.8 CAPSULE ORAL
Qty: 180 CAPSULE | Refills: 1 | Status: SHIPPED | OUTPATIENT
Start: 2020-12-11

## 2020-12-11 ASSESSMENT — MIFFLIN-ST. JEOR: SCORE: 1566.43

## 2021-05-27 NOTE — TELEPHONE ENCOUNTER
Refill Approved    Rx renewed per Medication Renewal Policy. Medication was last renewed on 5/16/18.    Angelia Sosa, [facility] Triage/Med Refill 4/15/2019     Requested Prescriptions   Pending Prescriptions Disp Refills     losartan (COZAAR) 100 MG tablet [Pharmacy Med Name: Losartan Potassium Oral Tablet 100 MG] 30 tablet 1     Sig: Take 1 tablet by mouth daily.       Angiotensin Receptor Blocker Protocol Passed - 4/13/2019  9:35 AM        Passed - PCP or prescribing provider visit in past 12 months       Last office visit with prescriber/PCP: 12/20/2018 Main Lowery MD OR same dept: 12/20/2018 Main Lowery MD OR same specialty: 12/20/2018 Main Lowery MD  Last physical: 3/21/2019 Last MTM visit: Visit date not found   Next visit within 3 mo: Visit date not found  Next physical within 3 mo: Visit date not found  Prescriber OR PCP: Main Lowery MD  Last diagnosis associated with med order: 1. HTN (hypertension)  - losartan (COZAAR) 100 MG tablet [Pharmacy Med Name: Losartan Potassium Oral Tablet 100 MG]; Take 1 tablet by mouth daily.  Dispense: 30 tablet; Refill: 1    If protocol passes may refill for 12 months if within 3 months of last provider visit (or a total of 15 months).             Passed - Serum potassium within the past 12 months     Lab Results   Component Value Date    Potassium 4.4 03/21/2019             Passed - Blood pressure filed in past 12 months     BP Readings from Last 1 Encounters:   03/21/19 136/86             Passed - Serum creatinine within the past 12 months     Creatinine   Date Value Ref Range Status   03/21/2019 1.04 0.70 - 1.30 mg/dL Final

## 2021-05-28 NOTE — TELEPHONE ENCOUNTER
Has been having intermittent chest pain since yesterday    The pain is sharp and lasts 50 seconds to a minute and the pain is getting worse    The pain seems to be happening more often as well.    Doesn't think that he is short of breath    It is located left lower chest and goes under his left arm.    No back pain    Some neck pain    No stomach pain    No dizziness    Advised that he go to the ER for further Evaluation per protocol.    Nicki River RN  [facility] Medication Refill and Triage Nurse  5/2/2019  1:20 PM      Reason for Disposition    Intermittent chest pain and pain has been increasing in severity or frequency    Protocols used: CHEST PAIN-A-OH

## 2021-05-28 NOTE — TELEPHONE ENCOUNTER
Refill Approved    Rx renewed per Medication Renewal Policy. Medication was last renewed on 5/16/18.    Angelia Sosa, [facility] Connection Triage/Med Refill 5/8/2019     Requested Prescriptions   Pending Prescriptions Disp Refills     aspirin 81 MG EC tablet [Pharmacy Med Name: Aspirin Low Dose Oral Tablet Delayed Release 81 MG] 30 tablet 2     Sig: Take 1 tablet by mouth daily.       Aspirin/Dipyridamole Refill Protocol Passed - 5/8/2019 10:35 AM        Passed - PCP or prescribing provider visit in past 12 months       Last office visit with prescriber/PCP: 12/20/2018 Main Lowery MD OR same dept: 12/20/2018 Main Lowery MD OR same specialty: 12/20/2018 Main Lowery MD  Last physical: 3/21/2019 Last MTM visit: Visit date not found    Next appt within 3 mo: Visit date not found Next physical within 3 mo: Visit date not found  Prescriber OR PCP: Main Lowery MD  Last diagnosis associated with med order: 1. HTN (hypertension)  - aspirin 81 MG EC tablet [Pharmacy Med Name: Aspirin Low Dose Oral Tablet Delayed Release 81 MG]; Take 1 tablet by mouth daily.  Dispense: 30 tablet; Refill: 2    If protocol passes may refill for 6 months if within 3 months of last provider visit (or a total of 9 months).

## 2021-05-29 NOTE — TELEPHONE ENCOUNTER
RN cannot approve Refill Request: Metformin    RN can NOT refill this medication PCP messaged that patient is overdue for Labs. Last office visit: 12/20/2018 Main Lowery MD Last Physical: 3/21/2019 Last MTM visit: Visit date not found Last visit same specialty: 12/20/2018 Main Lowery MD.  Next visit within 3 mo: Visit date not found  Next physical within 3 mo: Visit date not found      Taylor Bang, [facility] Triage/Med Refill 6/1/2019    Requested Prescriptions   Pending Prescriptions Disp Refills     atorvastatin (LIPITOR) 40 MG tablet [Pharmacy Med Name: Atorvastatin Calcium Oral Tablet 40 MG] 30 tablet 0     Sig: TAKE ONE TABLET BY MOUTH ONE TIME DAILY       Statins Refill Protocol (Hmg CoA Reductase Inhibitors) Passed - 5/31/2019 12:43 PM        Passed - PCP or prescribing provider visit in past 12 months      Last office visit with prescriber/PCP: 12/20/2018 Main Lowery MD OR same dept: 12/20/2018 Main Lowery MD OR same specialty: 12/20/2018 Main Lowery MD  Last physical: 3/21/2019 Last MTM visit: Visit date not found   Next visit within 3 mo: Visit date not found  Next physical within 3 mo: Visit date not found  Prescriber OR PCP: Main Lowery MD  Last diagnosis associated with med order: 1. HTN (hypertension)  - atorvastatin (LIPITOR) 40 MG tablet [Pharmacy Med Name: Atorvastatin Calcium Oral Tablet 40 MG]; TAKE ONE TABLET BY MOUTH ONE TIME DAILY   Dispense: 30 tablet; Refill: 0    If protocol passes may refill for 12 months if within 3 months of last provider visit (or a total of 15 months).             hydroCHLOROthiazide (HYDRODIURIL) 12.5 MG tablet [Pharmacy Med Name: hydroCHLOROthiazide Oral Tablet 12.5 MG] 30 tablet 2     Sig: Take 1 tablet (12.5 mg total) by mouth daily.       Diuretics/Combination Diuretics Refill Protocol  Passed - 5/31/2019 12:43 PM        Passed - Visit with PCP or prescribing provider visit in past 12 months      Last office visit with prescriber/PCP: 12/20/2018 Main Lowery MD OR same dept: 12/20/2018 aMin Lowery MD OR same specialty: 12/20/2018 Main Lowery MD  Last physical: 3/21/2019 Last MTM visit: Visit date not found   Next visit within 3 mo: Visit date not found  Next physical within 3 mo: Visit date not found  Prescriber OR PCP: Main Lowery MD  Last diagnosis associated with med order: 1. HTN (hypertension)  - atorvastatin (LIPITOR) 40 MG tablet [Pharmacy Med Name: Atorvastatin Calcium Oral Tablet 40 MG]; TAKE ONE TABLET BY MOUTH ONE TIME DAILY   Dispense: 30 tablet; Refill: 0    If protocol passes may refill for 12 months if within 3 months of last provider visit (or a total of 15 months).             Passed - Serum Potassium in past 12 months      Lab Results   Component Value Date    Potassium 4.4 03/21/2019             Passed - Serum Sodium in past 12 months      Lab Results   Component Value Date    Sodium 143 03/21/2019             Passed - Blood pressure on file in past 12 months     BP Readings from Last 1 Encounters:   03/21/19 136/86             Passed - Serum Creatinine in past 12 months      Creatinine   Date Value Ref Range Status   03/21/2019 1.04 0.70 - 1.30 mg/dL Final             metFORMIN (GLUCOPHAGE-XR) 750 MG 24 hr tablet [Pharmacy Med Name: metFORMIN HCl ER Oral Tablet Extended Release 24 Hour 750 MG] 30 tablet 1     Sig: Take 1 tablet (750 mg total) by mouth daily with breakfast.       Metformin Refill Protocol Failed - 5/31/2019 12:43 PM        Failed - Microalbumin in last year      No results found for: MICROALBUR               Passed - Blood pressure in last 12 months     BP Readings from Last 1 Encounters:   03/21/19 136/86             Passed - LFT or AST or ALT in last 12 months     Albumin   Date Value Ref Range Status   03/21/2019 4.4 3.5 - 5.0 g/dL Final     Bilirubin, Total   Date Value Ref Range Status   03/21/2019 0.5 0.0 - 1.0 mg/dL Final      Alkaline Phosphatase   Date Value Ref Range Status   03/21/2019 76 45 - 120 U/L Final     AST   Date Value Ref Range Status   03/21/2019 15 0 - 40 U/L Final     ALT   Date Value Ref Range Status   03/21/2019 11 0 - 45 U/L Final     Protein, Total   Date Value Ref Range Status   03/21/2019 7.6 6.0 - 8.0 g/dL Final                Passed - GFR or Serum Creatinine in last 6 months     GFR MDRD Non Af Amer   Date Value Ref Range Status   03/21/2019 >60 >60 mL/min/1.73m2 Final     GFR MDRD Af Amer   Date Value Ref Range Status   03/21/2019 >60 >60 mL/min/1.73m2 Final             Passed - Visit with PCP or prescribing provider visit in last 6 months or next 3 months     Last office visit with prescriber/PCP: 12/20/2018 OR same dept: 12/20/2018 Main Lowery MD OR same specialty: 12/20/2018 Main Lowery MD Last physical: 3/21/2019 Last MTM visit: Visit date not found         Next appt within 3 mo: Visit date not found  Next physical within 3 mo: Visit date not found  Prescriber OR PCP: Main Lowery MD  Last diagnosis associated with med order: 1. HTN (hypertension)  - atorvastatin (LIPITOR) 40 MG tablet [Pharmacy Med Name: Atorvastatin Calcium Oral Tablet 40 MG]; TAKE ONE TABLET BY MOUTH ONE TIME DAILY   Dispense: 30 tablet; Refill: 0     If protocol passes may refill for 12 months if within 3 months of last provider visit (or a total of 15 months).           Passed - A1C in last 6 months     Hemoglobin A1c   Date Value Ref Range Status   03/21/2019 6.8 (H) 3.5 - 6.0 % Final

## 2021-05-29 NOTE — TELEPHONE ENCOUNTER
Refills Approved x 2     Rx renewed per Medication Renewal Policy. Medication was last renewed on   Atorvastatin = 3/17/2019 with 1 refill  HCTZ = 5/23/2018 with 3 refills  Last office visit: 3/21/2019 with PCP Dr TONY Bang, [facility] Triage/Med Refill 6/1/2019     Requested Prescriptions   Pending Prescriptions Disp Refills     atorvastatin (LIPITOR) 40 MG tablet [Pharmacy Med Name: Atorvastatin Calcium Oral Tablet 40 MG] 30 tablet 0     Sig: TAKE ONE TABLET BY MOUTH ONE TIME DAILY       Statins Refill Protocol (Hmg CoA Reductase Inhibitors) Passed - 5/31/2019 12:43 PM        Passed - PCP or prescribing provider visit in past 12 months      Last office visit with prescriber/PCP: 12/20/2018 Main Lowery MD OR same dept: 12/20/2018 Main Lowery MD OR same specialty: 12/20/2018 Main Lowery MD  Last physical: 3/21/2019 Last MTM visit: Visit date not found   Next visit within 3 mo: Visit date not found  Next physical within 3 mo: Visit date not found  Prescriber OR PCP: Main Lowery MD  Last diagnosis associated with med order: 1. HTN (hypertension)  - atorvastatin (LIPITOR) 40 MG tablet [Pharmacy Med Name: Atorvastatin Calcium Oral Tablet 40 MG]; TAKE ONE TABLET BY MOUTH ONE TIME DAILY   Dispense: 30 tablet; Refill: 0    If protocol passes may refill for 12 months if within 3 months of last provider visit (or a total of 15 months).             hydroCHLOROthiazide (HYDRODIURIL) 12.5 MG tablet [Pharmacy Med Name: hydroCHLOROthiazide Oral Tablet 12.5 MG] 30 tablet 2     Sig: Take 1 tablet (12.5 mg total) by mouth daily.       Diuretics/Combination Diuretics Refill Protocol  Passed - 5/31/2019 12:43 PM        Passed - Visit with PCP or prescribing provider visit in past 12 months     Last office visit with prescriber/PCP: 12/20/2018 Main Lowery MD OR same dept: 12/20/2018 Main Lowery MD OR same specialty: 12/20/2018 Main Lowery MD   Last physical: 3/21/2019 Last MTM visit: Visit date not found   Next visit within 3 mo: Visit date not found  Next physical within 3 mo: Visit date not found  Prescriber OR PCP: Main Lowery MD  Last diagnosis associated with med order: 1. HTN (hypertension)  - atorvastatin (LIPITOR) 40 MG tablet [Pharmacy Med Name: Atorvastatin Calcium Oral Tablet 40 MG]; TAKE ONE TABLET BY MOUTH ONE TIME DAILY   Dispense: 30 tablet; Refill: 0    If protocol passes may refill for 12 months if within 3 months of last provider visit (or a total of 15 months).             Passed - Serum Potassium in past 12 months      Lab Results   Component Value Date    Potassium 4.4 03/21/2019             Passed - Serum Sodium in past 12 months      Lab Results   Component Value Date    Sodium 143 03/21/2019             Passed - Blood pressure on file in past 12 months     BP Readings from Last 1 Encounters:   03/21/19 136/86             Passed - Serum Creatinine in past 12 months      Creatinine   Date Value Ref Range Status   03/21/2019 1.04 0.70 - 1.30 mg/dL Final             metFORMIN (GLUCOPHAGE-XR) 750 MG 24 hr tablet [Pharmacy Med Name: metFORMIN HCl ER Oral Tablet Extended Release 24 Hour 750 MG] 30 tablet 1     Sig: Take 1 tablet (750 mg total) by mouth daily with breakfast.       Metformin Refill Protocol Failed - 5/31/2019 12:43 PM        Failed - Microalbumin in last year      No results found for: MICROALBUR               Passed - Blood pressure in last 12 months     BP Readings from Last 1 Encounters:   03/21/19 136/86             Passed - LFT or AST or ALT in last 12 months     Albumin   Date Value Ref Range Status   03/21/2019 4.4 3.5 - 5.0 g/dL Final     Bilirubin, Total   Date Value Ref Range Status   03/21/2019 0.5 0.0 - 1.0 mg/dL Final     Alkaline Phosphatase   Date Value Ref Range Status   03/21/2019 76 45 - 120 U/L Final     AST   Date Value Ref Range Status   03/21/2019 15 0 - 40 U/L Final     ALT   Date Value Ref  Range Status   03/21/2019 11 0 - 45 U/L Final     Protein, Total   Date Value Ref Range Status   03/21/2019 7.6 6.0 - 8.0 g/dL Final                Passed - GFR or Serum Creatinine in last 6 months     GFR MDRD Non Af Amer   Date Value Ref Range Status   03/21/2019 >60 >60 mL/min/1.73m2 Final     GFR MDRD Af Amer   Date Value Ref Range Status   03/21/2019 >60 >60 mL/min/1.73m2 Final             Passed - Visit with PCP or prescribing provider visit in last 6 months or next 3 months     Last office visit with prescriber/PCP: 12/20/2018 OR same dept: 12/20/2018 Main Lowery MD OR same specialty: 12/20/2018 Main Lowery MD Last physical: 3/21/2019 Last MTM visit: Visit date not found         Next appt within 3 mo: Visit date not found  Next physical within 3 mo: Visit date not found  Prescriber OR PCP: Main Lowery MD  Last diagnosis associated with med order: 1. HTN (hypertension)  - atorvastatin (LIPITOR) 40 MG tablet [Pharmacy Med Name: Atorvastatin Calcium Oral Tablet 40 MG]; TAKE ONE TABLET BY MOUTH ONE TIME DAILY   Dispense: 30 tablet; Refill: 0     If protocol passes may refill for 12 months if within 3 months of last provider visit (or a total of 15 months).           Passed - A1C in last 6 months     Hemoglobin A1c   Date Value Ref Range Status   03/21/2019 6.8 (H) 3.5 - 6.0 % Final

## 2021-05-30 ENCOUNTER — RECORDS - HEALTHEAST (OUTPATIENT)
Dept: ADMINISTRATIVE | Facility: CLINIC | Age: 66
End: 2021-05-30

## 2021-05-30 VITALS — BODY MASS INDEX: 26.03 KG/M2 | WEIGHT: 175.75 LBS | HEIGHT: 69 IN

## 2021-05-30 VITALS — BODY MASS INDEX: 25.18 KG/M2 | WEIGHT: 170 LBS | HEIGHT: 69 IN

## 2021-05-30 NOTE — TELEPHONE ENCOUNTER
Triage note:    Patient states that PCP was going to send a new BP med (amlodipine) to pharmacy this morning, but the pharmacy said they didn't receive it.     RN notes the Amlodipine Rx was ordered today but it was class: no print so it didn't e-scribe.  RN resent current Amlodipine Rx to [facility] now.  Marie Guy RN, [facility] Med Refill/Triage, 6/26/2019 12:21 PM

## 2021-05-30 NOTE — TELEPHONE ENCOUNTER
Nickie Calixto for refill requested below?  Refill has been set up for you to review.  Thank you.  Kavitha JIMENEZ, NATASHA/KELVIN....................12:22 PM

## 2021-05-30 NOTE — TELEPHONE ENCOUNTER
Medication Request  Medication name:    Disp Refills Start End    losartan (COZAAR) 100 MG tablet 90 tablet 3 6/26/2019     Sig - Route: Take 1 tablet (100 mg total) by mouth at bedtime. - Oral    Class: No Print        Pharmacy Name and Location: [facility] [place]  Reason for request: Per the request of patient.  When did you use medication last?:  Unknown  Patient offered appointment:  No   Okay to leave a detailed message: no

## 2021-05-30 NOTE — TELEPHONE ENCOUNTER
Spoke with the patient and relayed message below from Dr. Castillo.  Patient verbalized understanding and had no further questions at this time.  Kavitha JIMENEZ, NATASHA/KELVIN....................1:03 PM

## 2021-05-30 NOTE — PROGRESS NOTES
Office Visit - Follow Up   Jamar Lorenzo   63 y.o. male    Date of Visit: 6/26/2019    Chief Complaint   Patient presents with     Fatigue        Assessment and Plan   1. Type 2 diabetes mellitus with other circulatory complication, without long-term current use of insulin (H)  Has been well controlled continue current medications, metformin, aspirin, statin, losartan, annual diabetic eye exam and excellent diabetic foot care    2. HTN (hypertension)  Pressure controlled, he would like to stop hydrochlorothiazide.  We will do this and restart amlodipine.  Previously has had edema with amlodipine.  He would still like to try this and will take it at night with losartan.  - amLODIPine (NORVASC) 5 MG tablet; Take 1 tablet (5 mg total) by mouth at bedtime.  Dispense: 90 tablet; Refill: 3  - losartan (COZAAR) 100 MG tablet; Take 1 tablet (100 mg total) by mouth at bedtime.  Dispense: 90 tablet; Refill: 3    3. Erectile dysfunction, unspecified erectile dysfunction type  Likely multifactorial and certainly medications could be playing a role.  I suggested he try Viagra but he declined.  Switching medications as above.  I have recommended follow-up with his urologist as well.    4. Postoperative hypothyroidism  Continue levothyroxine    5. Benign prostatic hyperplasia with urinary frequency  As above, follow-up with urology, continue tamsulosin    6. Immunization due  - Pneumococcal conjugate vaccine 13-valent 6wks-17yrs; >50yrs    7. Screening for colon cancer  - Cologuard    Return in about 3 months (around 9/26/2019) for diabetes follow up.     History of Present Illness   This 63 y.o. old man comes in for follow-up.  Overall doing okay.  Over the past few months he has had more difficulty with erections.  He is able to obtain an erection but it takes longer than historically.  He has been told by his friends as a result of his medications.  He is also been told by his friends that Viagra does not work.  He wants to  "stop hydrochlorothiazide.  Otherwise feeling okay.  Recently saw orthopedic surgery injection into the knee was helpful.  No lightheadedness or dizziness.  No chest pain or shortness of breath.  Occasionally has some stiffness and/or weakness on his right side which is long-term effect after his stroke.    Review of Systems: A comprehensive review of systems was negative except as noted.     Medications, Allergies and Problem List   Reviewed, reconciled and updated  Post Discharge Medication Reconciliation Status: patient was not discharged from an inpatient facility     Physical Exam   General Appearance:   No acute distress    /80 (Patient Site: Left Arm, Patient Position: Sitting, Cuff Size: Adult Regular)   Pulse 91   Ht 5' 9\" (1.753 m)   Wt 178 lb (80.7 kg)   SpO2 96%   BMI 26.29 kg/m      HEENT exam is unremarkable  Neck supple no thyromegaly or nodule palpable  Lymphatic no cervical lymphadenopathy  Cardiovascular regular rate and rhythm no murmur gallop or rub  Pulmonary lungs are clear to auscultation bilaterally  Gastrointestinal abdomen soft nontender nondistended no organomegaly  Neurologic exam is non focal, ambulates without difficulty, strength on right side fairly symmetric compared to left  Psychiatric pleasant, no confusion or agitation        Additional Information   Current Outpatient Medications   Medication Sig Dispense Refill     acetaminophen (TYLENOL) 650 MG CR tablet Take 2 tablets (1,300 mg total) by mouth every 8 (eight) hours as needed for pain. (Patient taking differently: Take 1,300 mg by mouth every 8 (eight) hours as needed for pain (Patient takes 650 mg every 8 hours as needed) .      ) 180 tablet 11     aspirin 81 MG EC tablet Take 1 tablet by mouth daily. 90 tablet 1     atorvastatin (LIPITOR) 40 MG tablet TAKE ONE TABLET BY MOUTH ONE TIME DAILY  30 tablet 11     fluticasone (FLONASE) 50 mcg/actuation nasal spray 1-2 sprays into each nostril daily. 16 g 11     " levothyroxine (SYNTHROID, LEVOTHROID) 150 MCG tablet Take 1 tablet (150 mcg total) by mouth Daily at 6:00 am. 90 tablet 2     losartan (COZAAR) 100 MG tablet Take 1 tablet (100 mg total) by mouth at bedtime. 90 tablet 3     metFORMIN (GLUCOPHAGE-XR) 750 MG 24 hr tablet Take 1 tablet (750 mg total) by mouth daily with breakfast. 30 tablet 1     multivitamin therapeutic tablet Take 1 tablet by mouth daily.       omega-3/dha/epa/fish oil (FISH OIL-OMEGA-3 FATTY ACIDS) 300-1,000 mg capsule Take 1 capsule (1 g total) by mouth daily. 90 capsule 3     polyvinyl alcohol (ARTIFICIAL TEARS, POLYVIN ALC,) 1.4 % ophthalmic solution Apply one drop into each eye four times daily as needed for dry eyes. 15 mL 10     tamsulosin (FLOMAX) 0.4 mg Cp24 Take 1 capsule (0.4 mg total) by mouth daily after supper. 90 capsule 1     triamcinolone (KENALOG) 0.1 % cream Use once daily as needed.. 30 g 2     amLODIPine (NORVASC) 5 MG tablet Take 1 tablet (5 mg total) by mouth at bedtime. 90 tablet 3     No current facility-administered medications for this visit.      Allergies   Allergen Reactions     Lisinopril Swelling     Sulfamethoxazole-Trimethoprim Rash     Inflamed and painful      Social History     Tobacco Use     Smoking status: Never Smoker     Smokeless tobacco: Never Used   Substance Use Topics     Alcohol use: Yes     Alcohol/week: 0.6 oz     Types: 1 Glasses of wine per week     Comment: once per week     Drug use: No       Review and/or order of clinical lab tests:  Review and/or order of radiology tests:  Review and/or order of medicine tests:  Discussion of test results with performing physician:  Decision to obtain old records and/or obtain history from someone other than the patient:  Review and summarization of old records and/or obtaining history from someone other than the patient and.or discussion of case with another health care provider:  Independent visualization of image, tracing or specimen itself:    Time:       Main Lowery MD

## 2021-05-31 VITALS — BODY MASS INDEX: 25.77 KG/M2 | WEIGHT: 174 LBS | HEIGHT: 69 IN

## 2021-05-31 VITALS — BODY MASS INDEX: 25.48 KG/M2 | WEIGHT: 172 LBS | HEIGHT: 69 IN

## 2021-05-31 VITALS — BODY MASS INDEX: 25.48 KG/M2 | HEIGHT: 69 IN | WEIGHT: 172 LBS

## 2021-05-31 VITALS — WEIGHT: 173 LBS | BODY MASS INDEX: 25.62 KG/M2 | HEIGHT: 69 IN

## 2021-05-31 VITALS — BODY MASS INDEX: 25.1 KG/M2 | WEIGHT: 170 LBS

## 2021-05-31 NOTE — TELEPHONE ENCOUNTER
RN cannot approve Refill Request    RN can NOT refill this medication PCP messaged that patient is overdue for Labs. Last office visit: 6/26/2019 aMin Lowery MD Last Physical: 3/21/2019 Last MTM visit: Visit date not found Last visit same specialty: 6/26/2019 Main Lowery MD.  Next visit within 3 mo: Visit date not found  Next physical within 3 mo: Visit date not found      Pollo Perez, [facility] Connection Triage/Med Refill 8/18/2019    Requested Prescriptions   Pending Prescriptions Disp Refills     metFORMIN (GLUCOPHAGE-XR) 750 MG 24 hr tablet [Pharmacy Med Name: metFORMIN HCl ER Oral Tablet Extended Release 24 Hour 750 MG] 30 tablet 0     Sig: Take 1 tablet (750 mg total) by mouth daily with breakfast.       Metformin Refill Protocol Failed - 8/18/2019 11:10 AM        Failed - Microalbumin in last year      No results found for: MICROALBUR               Passed - Blood pressure in last 12 months     BP Readings from Last 1 Encounters:   06/26/19 124/80             Passed - LFT or AST or ALT in last 12 months     Albumin   Date Value Ref Range Status   03/21/2019 4.4 3.5 - 5.0 g/dL Final     Bilirubin, Total   Date Value Ref Range Status   03/21/2019 0.5 0.0 - 1.0 mg/dL Final     Alkaline Phosphatase   Date Value Ref Range Status   03/21/2019 76 45 - 120 U/L Final     AST   Date Value Ref Range Status   03/21/2019 15 0 - 40 U/L Final     ALT   Date Value Ref Range Status   03/21/2019 11 0 - 45 U/L Final     Protein, Total   Date Value Ref Range Status   03/21/2019 7.6 6.0 - 8.0 g/dL Final                Passed - GFR or Serum Creatinine in last 6 months     GFR MDRD Non Af Amer   Date Value Ref Range Status   03/21/2019 >60 >60 mL/min/1.73m2 Final     GFR MDRD Af Amer   Date Value Ref Range Status   03/21/2019 >60 >60 mL/min/1.73m2 Final             Passed - Visit with PCP or prescribing provider visit in last 6 months or next 3 months     Last office visit with prescriber/PCP: 6/26/2019 OR same  dept: 6/26/2019 Main Lowery MD OR same specialty: 6/26/2019 Main Lowery MD Last physical: 3/21/2019 Last MTM visit: Visit date not found         Next appt within 3 mo: Visit date not found  Next physical within 3 mo: Visit date not found  Prescriber OR PCP: Main Lowery MD  Last diagnosis associated with med order: 1. Type 2 diabetes mellitus with other circulatory complication, without long-term current use of insulin (H)  - metFORMIN (GLUCOPHAGE-XR) 750 MG 24 hr tablet [Pharmacy Med Name: metFORMIN HCl ER Oral Tablet Extended Release 24 Hour 750 MG]; Take 1 tablet (750 mg total) by mouth daily with breakfast.  Dispense: 30 tablet; Refill: 0     If protocol passes may refill for 12 months if within 3 months of last provider visit (or a total of 15 months).           Passed - A1C in last 6 months     Hemoglobin A1c   Date Value Ref Range Status   03/21/2019 6.8 (H) 3.5 - 6.0 % Final

## 2021-05-31 NOTE — TELEPHONE ENCOUNTER
RN cannot approve Refill Request    RN can NOT refill this medication PCP messaged that patient is overdue for Labs. Last office visit: 6/26/2019 Main Lowery MD Last Physical: 3/21/2019 Last MTM visit: Visit date not found Last visit same specialty: 6/26/2019 Main Lowery MD.  Next visit within 3 mo: Visit date not found  Next physical within 3 mo: Visit date not found      Taylor Bang, [facility] Triage/Med Refill 8/24/2019    Requested Prescriptions   Pending Prescriptions Disp Refills     metFORMIN (GLUCOPHAGE-XR) 750 MG 24 hr tablet [Pharmacy Med Name: metFORMIN HCl ER Oral Tablet Extended Release 24 Hour 750 MG] 30 tablet 0     Sig: Take 1 tablet (750 mg total) by mouth daily with breakfast.       Metformin Refill Protocol Failed - 8/23/2019  8:55 PM        Failed - Microalbumin in last year      No results found for: MICROALBUR               Passed - Blood pressure in last 12 months     BP Readings from Last 1 Encounters:   06/26/19 124/80             Passed - LFT or AST or ALT in last 12 months     Albumin   Date Value Ref Range Status   03/21/2019 4.4 3.5 - 5.0 g/dL Final     Bilirubin, Total   Date Value Ref Range Status   03/21/2019 0.5 0.0 - 1.0 mg/dL Final     Alkaline Phosphatase   Date Value Ref Range Status   03/21/2019 76 45 - 120 U/L Final     AST   Date Value Ref Range Status   03/21/2019 15 0 - 40 U/L Final     ALT   Date Value Ref Range Status   03/21/2019 11 0 - 45 U/L Final     Protein, Total   Date Value Ref Range Status   03/21/2019 7.6 6.0 - 8.0 g/dL Final                Passed - GFR or Serum Creatinine in last 6 months     GFR MDRD Non Af Amer   Date Value Ref Range Status   03/21/2019 >60 >60 mL/min/1.73m2 Final     GFR MDRD Af Amer   Date Value Ref Range Status   03/21/2019 >60 >60 mL/min/1.73m2 Final             Passed - Visit with PCP or prescribing provider visit in last 6 months or next 3 months     Last office visit with prescriber/PCP: 6/26/2019 OR same  dept: 6/26/2019 Main Lowery MD OR same specialty: 6/26/2019 Main Lowery MD Last physical: 3/21/2019 Last MTM visit: Visit date not found         Next appt within 3 mo: Visit date not found  Next physical within 3 mo: Visit date not found  Prescriber OR PCP: Main Lowery MD  Last diagnosis associated with med order: 1. Type 2 diabetes mellitus with other circulatory complication, without long-term current use of insulin (H)  - metFORMIN (GLUCOPHAGE-XR) 750 MG 24 hr tablet [Pharmacy Med Name: metFORMIN HCl ER Oral Tablet Extended Release 24 Hour 750 MG]; Take 1 tablet (750 mg total) by mouth daily with breakfast.  Dispense: 30 tablet; Refill: 0    2. Postoperative hypothyroidism  - levothyroxine (SYNTHROID, LEVOTHROID) 150 MCG tablet [Pharmacy Med Name: Levothyroxine Sodium Oral Tablet 150 MCG]; Take 1 tablet (150 mcg total) by mouth Daily at 6:00 am.  Dispense: 30 tablet; Refill: 1     If protocol passes may refill for 12 months if within 3 months of last provider visit (or a total of 15 months).           Passed - A1C in last 6 months     Hemoglobin A1c   Date Value Ref Range Status   03/21/2019 6.8 (H) 3.5 - 6.0 % Final               levothyroxine (SYNTHROID, LEVOTHROID) 150 MCG tablet [Pharmacy Med Name: Levothyroxine Sodium Oral Tablet 150 MCG] 30 tablet 1     Sig: Take 1 tablet (150 mcg total) by mouth Daily at 6:00 am.       Thyroid Hormones Protocol Passed - 8/23/2019  8:55 PM        Passed - Provider visit in past 12 months or next 3 months     Last office visit with prescriber/PCP: 6/26/2019 Main Lowery MD OR same dept: 6/26/2019 Main Lowery MD OR same specialty: 6/26/2019 Main Lowery MD  Last physical: 3/21/2019 Last MTM visit: Visit date not found   Next visit within 3 mo: Visit date not found  Next physical within 3 mo: Visit date not found  Prescriber OR PCP: Main Lowery MD  Last diagnosis associated with med order: 1. Type 2 diabetes  mellitus with other circulatory complication, without long-term current use of insulin (H)  - metFORMIN (GLUCOPHAGE-XR) 750 MG 24 hr tablet [Pharmacy Med Name: metFORMIN HCl ER Oral Tablet Extended Release 24 Hour 750 MG]; Take 1 tablet (750 mg total) by mouth daily with breakfast.  Dispense: 30 tablet; Refill: 0    2. Postoperative hypothyroidism  - levothyroxine (SYNTHROID, LEVOTHROID) 150 MCG tablet [Pharmacy Med Name: Levothyroxine Sodium Oral Tablet 150 MCG]; Take 1 tablet (150 mcg total) by mouth Daily at 6:00 am.  Dispense: 30 tablet; Refill: 1    If protocol passes may refill for 12 months if within 3 months of last provider visit (or a total of 15 months).             Passed - TSH on file in past 12 months for patient age 12 & older     TSH   Date Value Ref Range Status   03/21/2019 0.59 0.30 - 5.00 uIU/mL Final

## 2021-06-01 VITALS — WEIGHT: 179 LBS | HEIGHT: 69 IN | BODY MASS INDEX: 26.51 KG/M2

## 2021-06-01 VITALS — WEIGHT: 173 LBS | HEIGHT: 69 IN | BODY MASS INDEX: 25.62 KG/M2

## 2021-06-01 VITALS — WEIGHT: 176 LBS | HEIGHT: 69 IN | BODY MASS INDEX: 26.07 KG/M2

## 2021-06-01 NOTE — PROGRESS NOTES
"  Office Visit - Follow Up   Jamar Lorenzo   63 y.o. male    Date of Visit: 9/27/2019    Chief Complaint   Patient presents with     Diabetes        Assessment and Plan   1. HTN (hypertension)  Blood pressures a bit elevated will increase amlodipine to 10 mg and have him follow-up within a month for recheck  - amLODIPine (NORVASC) 10 MG tablet; Take 1 tablet (10 mg total) by mouth at bedtime.  Dispense: 90 tablet; Refill: 3    2. Type 2 diabetes mellitus with other circulatory complication, without long-term current use of insulin (H)  Otherwise well controlled continue current medications, annual diabetic eye exam and excellent diabetic foot care  - Glycosylated Hemoglobin A1c  - Basic Metabolic Panel  - HM2(CBC w/o Differential)    3. Postoperative hypothyroidism  Continue levothyroxine    4. Cerebrovascular accident (CVA), unspecified mechanism (H)  Stable continue secondary prevention    Return in about 4 weeks (around 10/25/2019) for recheck.     History of Present Illness   This 63 y.o. old man comes in for follow-up.  Overall feeling well.  Occasional right shoulder and arm pain which improves with some range of motion exercises.  No chest pain or shortness of breath.  No nausea or vomiting.  No fever chills.  He took his blood pressure medications this morning.    Review of Systems: A comprehensive review of systems was negative except as noted.     Medications, Allergies and Problem List   Reviewed, reconciled and updated  Post Discharge Medication Reconciliation Status:      Physical Exam   General Appearance:   No acute distress    BP (!) 148/92 (Patient Site: Right Arm, Patient Position: Sitting, Cuff Size: Adult Regular)   Pulse 77   Ht 5' 9\" (1.753 m)   Wt 176 lb (79.8 kg)   SpO2 94%   BMI 25.99 kg/m      HEENT exam is unremarkable  Neck supple no thyromegaly or nodule palpable  Lymphatic no cervical lymphadenopathy  Cardiovascular regular rate and rhythm no murmur gallop or rub  Pulmonary lungs " are clear to auscultation bilaterally  Gastrointestinal abdomen soft nontender nondistended no organomegaly  Neurologic exam is non focal  Psychiatric pleasant, no confusion or agitation        Additional Information   Current Outpatient Medications   Medication Sig Dispense Refill     acetaminophen (TYLENOL) 650 MG CR tablet Take 2 tablets (1,300 mg total) by mouth every 8 (eight) hours as needed for pain. (Patient taking differently: Take 1,300 mg by mouth every 8 (eight) hours as needed for pain (Patient takes 650 mg every 8 hours as needed) .      ) 180 tablet 11     amLODIPine (NORVASC) 10 MG tablet Take 1 tablet (10 mg total) by mouth at bedtime. 90 tablet 3     aspirin 81 MG EC tablet Take 1 tablet by mouth daily. 90 tablet 1     atorvastatin (LIPITOR) 40 MG tablet TAKE ONE TABLET BY MOUTH ONE TIME DAILY  30 tablet 11     fluticasone (FLONASE) 50 mcg/actuation nasal spray 1-2 sprays into each nostril daily. 16 g 11     levothyroxine (SYNTHROID, LEVOTHROID) 150 MCG tablet Take 1 tablet (150 mcg total) by mouth Daily at 6:00 am. 30 tablet 1     losartan (COZAAR) 100 MG tablet Take 1 tablet (100 mg total) by mouth at bedtime. 90 tablet 3     metFORMIN (GLUCOPHAGE-XR) 750 MG 24 hr tablet Take 1 tablet (750 mg total) by mouth daily with breakfast. 90 tablet 3     multivitamin therapeutic tablet Take 1 tablet by mouth daily.       omega-3/dha/epa/fish oil (FISH OIL-OMEGA-3 FATTY ACIDS) 300-1,000 mg capsule Take 1 capsule (1 g total) by mouth daily. 90 capsule 3     polyvinyl alcohol (ARTIFICIAL TEARS, POLYVIN ALC,) 1.4 % ophthalmic solution Apply one drop into each eye four times daily as needed for dry eyes. 15 mL 10     tamsulosin (FLOMAX) 0.4 mg Cp24 Take 1 capsule (0.4 mg total) by mouth daily after supper. 90 capsule 1     triamcinolone (KENALOG) 0.1 % cream Use once daily as needed.. 30 g 2     No current facility-administered medications for this visit.      Allergies   Allergen Reactions     Lisinopril  Swelling     Sulfamethoxazole-Trimethoprim Rash     Inflamed and painful      Social History     Tobacco Use     Smoking status: Never Smoker     Smokeless tobacco: Never Used   Substance Use Topics     Alcohol use: Yes     Alcohol/week: 1.0 standard drinks     Types: 1 Glasses of wine per week     Comment: once per week     Drug use: No       Review and/or order of clinical lab tests:  Review and/or order of radiology tests:  Review and/or order of medicine tests:  Discussion of test results with performing physician:  Decision to obtain old records and/or obtain history from someone other than the patient:  Review and summarization of old records and/or obtaining history from someone other than the patient and.or discussion of case with another health care provider:  Independent visualization of image, tracing or specimen itself:    Time:      Main Lowery MD

## 2021-06-02 VITALS — BODY MASS INDEX: 25.92 KG/M2 | HEIGHT: 69 IN | WEIGHT: 175 LBS

## 2021-06-02 VITALS — WEIGHT: 173 LBS | BODY MASS INDEX: 25.55 KG/M2

## 2021-06-02 VITALS — HEIGHT: 69 IN | BODY MASS INDEX: 25.62 KG/M2 | WEIGHT: 173 LBS

## 2021-06-02 NOTE — TELEPHONE ENCOUNTER
RN cannot approve Refill Request    RN can NOT refill this medication med is not covered by policy/route to provider. Last office visit: 9/27/2019 Main Lowery MD Last Physical: 3/21/2019 Last MTM visit: Visit date not found Last visit same specialty: 9/27/2019 Main Lowery MD.  Next visit within 3 mo: Visit date not found  Next physical within 3 mo: Visit date not found      Evelina Galvez, [facility] Triage/Med Refill 10/22/2019    Requested Prescriptions   Pending Prescriptions Disp Refills     FISH -1,000 mg cap [Pharmacy Med Name: Fish Oil Oral Capsule 1000 MG] 30 capsule 2     Sig: Take 1 capsule (1 g total) by mouth daily.       There is no refill protocol information for this order

## 2021-06-02 NOTE — PROGRESS NOTES
Office Visit - Follow Up   Jamar Lorenzo   63 y.o. male    Date of Visit: 10/29/2019    Chief Complaint   Patient presents with     Diabetes     Hypertension     Chest Pain        Assessment and Plan   1. Chest pain  Concerning for anginal chest pain.  EKG without any acute changes.  Continue aspirin, statin, arrange for nuclear medicine stress test  - Electrocardiogram Perform and Read  - NM Pharmacologic Stress Test; Future    2. Spinal stenosis in cervical region  Is unclear if the numbness in his arms from stroke or this spinal stenosis.  I will have him see the spine clinic.  - Ambulatory referral to [facility]    3. Type 2 diabetes mellitus with other circulatory complication, without long-term current use of insulin (H)  Fairly well controlled continue current medication and plan    4. Postoperative hypothyroidism  Continue levothyroxine    5. Essential hypertension  Improved, continue current medication    6. Cerebrovascular accident (CVA), unspecified mechanism (H)  Stable, continue secondary prevention    7. Benign prostatic hyperplasia with urinary frequency  Continue Flomax    Return in about 4 weeks (around 11/26/2019) for diabetes follow up.     History of Present Illness   This 63 y.o. old man comes in for follow-up of numerous medical problems as well as evaluation of a few issues.  His blood pressures been better with increase in amlodipine.  No lightheadedness dizziness.  He is having worsening numbness in his right arm and hand.  We previously had an EMG done in 2017 which we reviewed.  We also do cervical MRI which is incomplete but did shows a lot of degenerative changes and some spinal stenosis.  His arm symptoms are worsening.  He has no leg weakness.  No loss of bowel or bladder function.  He also has been having some exertional chest pain.  This occurs when he is just walking around the house and it squeezing sensation in his chest which relieves with rest after a minute or so.  No  "associated nausea or diaphoresis.  Does have a history of stroke, diabetes, hypertension.    Review of Systems: A comprehensive review of systems was negative except as noted.     Medications, Allergies and Problem List   Reviewed, reconciled and updated  Post Discharge Medication Reconciliation Status:      Physical Exam   General Appearance:   No acute distress    /86 (Patient Site: Left Arm, Patient Position: Sitting, Cuff Size: Adult Regular)   Pulse 95   Ht 5' 9\" (1.753 m)   Wt 176 lb (79.8 kg)   SpO2 97%   BMI 25.99 kg/m      HEENT exam is unremarkable  Neck supple no thyromegaly or nodule palpable  Lymphatic no cervical lymphadenopathy  Cardiovascular regular rate and rhythm no murmur gallop or rub  Pulmonary lungs are clear to auscultation bilaterally  Gastrointestinal abdomen soft nontender nondistended no organomegaly  Neurologic exam is non focal  Psychiatric pleasant, no confusion or agitation        Additional Information   Current Outpatient Medications   Medication Sig Dispense Refill     acetaminophen (TYLENOL) 650 MG CR tablet Take 2 tablets (1,300 mg total) by mouth every 8 (eight) hours as needed for pain. (Patient taking differently: Take 1,300 mg by mouth every 8 (eight) hours as needed for pain (Patient takes 650 mg every 8 hours as needed) .      ) 180 tablet 11     amLODIPine (NORVASC) 10 MG tablet Take 1 tablet (10 mg total) by mouth at bedtime. 90 tablet 3     aspirin 81 MG EC tablet Take 1 tablet by mouth daily. 90 tablet 1     atorvastatin (LIPITOR) 40 MG tablet TAKE ONE TABLET BY MOUTH ONE TIME DAILY  30 tablet 11     FISH -1,000 mg cap Take 1 capsule (1 g total) by mouth daily. 30 capsule 2     fluticasone (FLONASE) 50 mcg/actuation nasal spray 1-2 sprays into each nostril daily. 16 g 11     levothyroxine (SYNTHROID, LEVOTHROID) 150 MCG tablet Take 1 tablet (150 mcg total) by mouth Daily at 6:00 am. 30 tablet 1     losartan (COZAAR) 100 MG tablet Take 1 tablet (100 mg " total) by mouth at bedtime. 90 tablet 3     metFORMIN (GLUCOPHAGE-XR) 750 MG 24 hr tablet Take 1 tablet (750 mg total) by mouth daily with breakfast. 90 tablet 3     multivitamin therapeutic tablet Take 1 tablet by mouth daily.       polyvinyl alcohol (ARTIFICIAL TEARS, POLYVIN ALC,) 1.4 % ophthalmic solution Apply one drop into each eye four times daily as needed for dry eyes. 15 mL 10     tamsulosin (FLOMAX) 0.4 mg Cp24 Take 1 capsule (0.4 mg total) by mouth daily after supper. 90 capsule 1     triamcinolone (KENALOG) 0.1 % cream Use once daily as needed.. 30 g 2     No current facility-administered medications for this visit.      Allergies   Allergen Reactions     Lisinopril Swelling     Sulfamethoxazole-Trimethoprim Rash     Inflamed and painful      Social History     Tobacco Use     Smoking status: Never Smoker     Smokeless tobacco: Never Used   Substance Use Topics     Alcohol use: Yes     Alcohol/week: 1.0 standard drinks     Types: 1 Glasses of wine per week     Comment: once per week     Drug use: No       Review and/or order of clinical lab tests: Reviewed recent labs  Review and/or order of radiology tests: Reviewed previous MRI cervical spine, reviewed echocardiogram  Review and/or order of medicine tests: Viewed his EMG  Discussion of test results with performing physician:  Decision to obtain old records and/or obtain history from someone other than the patient:  Review and summarization of old records and/or obtaining history from someone other than the patient and.or discussion of case with another health care provider:  Independent visualization of image, tracing or specimen itself: Personally reviewed his EKG, normal sinus rhythm    Time:      Main Lowery MD

## 2021-06-03 VITALS
WEIGHT: 176 LBS | HEART RATE: 95 BPM | OXYGEN SATURATION: 97 % | HEIGHT: 69 IN | SYSTOLIC BLOOD PRESSURE: 130 MMHG | DIASTOLIC BLOOD PRESSURE: 86 MMHG | BODY MASS INDEX: 26.07 KG/M2

## 2021-06-03 VITALS
BODY MASS INDEX: 26.07 KG/M2 | DIASTOLIC BLOOD PRESSURE: 92 MMHG | WEIGHT: 176 LBS | SYSTOLIC BLOOD PRESSURE: 148 MMHG | HEART RATE: 77 BPM | HEIGHT: 69 IN | OXYGEN SATURATION: 94 %

## 2021-06-03 VITALS — WEIGHT: 178 LBS | HEIGHT: 69 IN | BODY MASS INDEX: 26.36 KG/M2

## 2021-06-03 VITALS
OXYGEN SATURATION: 98 % | DIASTOLIC BLOOD PRESSURE: 80 MMHG | WEIGHT: 180 LBS | HEART RATE: 95 BPM | SYSTOLIC BLOOD PRESSURE: 148 MMHG | HEIGHT: 69 IN | BODY MASS INDEX: 26.66 KG/M2

## 2021-06-03 NOTE — PROGRESS NOTES
"  Office Visit - Follow Up   Jamar Lorenzo   63 y.o. male    Date of Visit: 11/27/2019    Chief Complaint   Patient presents with     Cough        Assessment and Plan   1. Cough  Given duration of cough, inflammatory sputum, subjective fevers we will treat with azithromycin.  Also check a chest x-ray  - XR Chest 2 Views  - azithromycin (ZITHROMAX) 250 MG tablet; Take 2 tablets by mouth day one and 1 tablet by mouth days 2-5  Dispense: 6 tablet; Refill: 0    2. Shortness of breath  - XR Chest 2 Views  - azithromycin (ZITHROMAX) 250 MG tablet; Take 2 tablets by mouth day one and 1 tablet by mouth days 2-5  Dispense: 6 tablet; Refill: 0    3. Essential hypertension  Pressure elevated, continue current medications and add metoprolol  - metoprolol succinate (TOPROL-XL) 50 MG 24 hr tablet; Take 1 tablet (50 mg total) by mouth daily.  Dispense: 90 tablet; Refill: 3    Return in about 4 weeks (around 12/25/2019) for recheck.     History of Present Illness   This 63 y.o. old and comes in for some cough and shortness of breath.  This is been going on for couple of weeks.  He feels a little bit tight when he is breathing.  He feels some wheezing.  He has been coughing up inflammatory sputum and has had some subjective fevers.  No night sweats.  No weight loss.  Mild sore throat.  No ear pain.  Otherwise feeling okay.  Denies any specific chest pain apart from when he is coughing.    Review of Systems: A comprehensive review of systems was negative except as noted.     Medications, Allergies and Problem List   Reviewed, reconciled and updated  Post Discharge Medication Reconciliation Status:      Physical Exam   General Appearance:   No acute distress    /80 (Patient Site: Left Arm, Patient Position: Sitting, Cuff Size: Adult Regular)   Pulse 95   Ht 5' 9\" (1.753 m)   Wt 180 lb (81.6 kg)   SpO2 98%   BMI 26.58 kg/m      HEENT exam is unremarkable  Neck supple no thyromegaly or nodule palpable  Lymphatic no cervical " lymphadenopathy  Cardiovascular regular rate and rhythm no murmur gallop or rub  Pulmonary lungs are clear to auscultation bilaterally  Gastrointestinal abdomen soft nontender nondistended no organomegaly  Neurologic exam is non focal  Psychiatric pleasant, no confusion or agitation        Additional Information   Current Outpatient Medications   Medication Sig Dispense Refill     acetaminophen (TYLENOL) 650 MG CR tablet Take 2 tablets (1,300 mg total) by mouth every 8 (eight) hours as needed for pain. (Patient taking differently: Take 1,300 mg by mouth every 8 (eight) hours as needed for pain (Patient takes 650 mg every 8 hours as needed) .      ) 180 tablet 11     amLODIPine (NORVASC) 10 MG tablet Take 1 tablet (10 mg total) by mouth at bedtime. 90 tablet 3     aspirin 81 MG EC tablet Take 1 tablet by mouth daily. 90 tablet 1     atorvastatin (LIPITOR) 40 MG tablet TAKE ONE TABLET BY MOUTH ONE TIME DAILY  30 tablet 11     FISH -1,000 mg cap Take 1 capsule (1 g total) by mouth daily. 30 capsule 2     fluticasone (FLONASE) 50 mcg/actuation nasal spray 1-2 sprays into each nostril daily. 16 g 11     levothyroxine (SYNTHROID, LEVOTHROID) 150 MCG tablet Take 1 tablet (150 mcg total) by mouth Daily at 6:00 am. 30 tablet 1     losartan (COZAAR) 100 MG tablet Take 1 tablet (100 mg total) by mouth at bedtime. 90 tablet 3     metFORMIN (GLUCOPHAGE-XR) 750 MG 24 hr tablet Take 1 tablet (750 mg total) by mouth daily with breakfast. 90 tablet 3     multivitamin therapeutic tablet Take 1 tablet by mouth daily.       polyvinyl alcohol (ARTIFICIAL TEARS, POLYVIN ALC,) 1.4 % ophthalmic solution Apply one drop into each eye four times daily as needed for dry eyes. 15 mL 10     tamsulosin (FLOMAX) 0.4 mg Cp24 Take 1 capsule (0.4 mg total) by mouth daily after supper. 90 capsule 1     triamcinolone (KENALOG) 0.1 % cream Use once daily as needed.. 30 g 2     azithromycin (ZITHROMAX) 250 MG tablet Take 2 tablets by mouth day one  and 1 tablet by mouth days 2-5 6 tablet 0     metoprolol succinate (TOPROL-XL) 50 MG 24 hr tablet Take 1 tablet (50 mg total) by mouth daily. 90 tablet 3     No current facility-administered medications for this visit.      Allergies   Allergen Reactions     Lisinopril Swelling     Sulfamethoxazole-Trimethoprim Rash     Inflamed and painful      Social History     Tobacco Use     Smoking status: Never Smoker     Smokeless tobacco: Never Used   Substance Use Topics     Alcohol use: Yes     Alcohol/week: 1.0 standard drinks     Types: 1 Glasses of wine per week     Comment: once per week     Drug use: No       Review and/or order of clinical lab tests:  Review and/or order of radiology tests:  Review and/or order of medicine tests:  Discussion of test results with performing physician:  Decision to obtain old records and/or obtain history from someone other than the patient:  Review and summarization of old records and/or obtaining history from someone other than the patient and.or discussion of case with another health care provider:  Independent visualization of image, tracing or specimen itself:    Time:      Main Lowery MD

## 2021-06-03 NOTE — TELEPHONE ENCOUNTER
Pt declined giving phone number to RN.   Pt has coughing, wheezing when he lays down to sleep.   Denies any confusion, disorientation or slurred speech.   No difficulty breathing or wheezing when not laying down.   Denies chest pain other than pain when coughing.     Reason for Disposition    Wheezing is present    Protocols used: COUGH - ACUTE [id number]    Pt declined RN calling on-call provider to discuss.  Pt requests to be seen tomorrow morning.  Pt will be seen tomorrow at 11am.   Pt does agree to call back if he has any worsening sx, difficulty breathing or chest pain not related to coughing. Pt advised to call 911 if he is having trouble breathing. Pt agreed to plan.   Christi Sotelo, RN   [facility] RN Triage

## 2021-06-03 NOTE — TELEPHONE ENCOUNTER
Refill Approved    Rx renewed per Medication Renewal Policy. Medication was last renewed on .  levothyroxine (SYNTHROID, LEVOTHROID) 150 MCG tablet 30 tablet 1 8/27/2019     aspirin 81 MG EC tablet 90 tablet 1 5/8/2019    OV 11/27/19    Sheila Bishop, [facility] Triage/Med Refill 11/28/2019     Requested Prescriptions   Pending Prescriptions Disp Refills     levothyroxine (SYNTHROID, LEVOTHROID) 150 MCG tablet [Pharmacy Med Name: Levothyroxine Sodium Oral Tablet 150 MCG] 30 tablet 0     Sig: Take 1 tablet (150 mcg total) by mouth Daily at 6:00 am.       Thyroid Hormones Protocol Passed - 11/27/2019 10:30 AM        Passed - Provider visit in past 12 months or next 3 months     Last office visit with prescriber/PCP: 10/29/2019 Main Lowery MD OR same dept: 10/29/2019 Main Lowery MD OR same specialty: 10/29/2019 Main Lowery MD  Last physical: 3/21/2019 Last MTM visit: Visit date not found   Next visit within 3 mo: 11/27/2019 Main Lowery MD  Next physical within 3 mo: Visit date not found  Prescriber OR PCP: Main Lowery MD  Last diagnosis associated with med order: 1. Postoperative hypothyroidism  - levothyroxine (SYNTHROID, LEVOTHROID) 150 MCG tablet [Pharmacy Med Name: Levothyroxine Sodium Oral Tablet 150 MCG]; Take 1 tablet (150 mcg total) by mouth Daily at 6:00 am.  Dispense: 30 tablet; Refill: 0    2. HTN (hypertension)  - aspirin 81 MG EC tablet [Pharmacy Med Name: Aspirin Low Dose Oral Tablet Delayed Release 81 MG]; Take 1 tablet by mouth daily.  Dispense: 30 tablet; Refill: 0    If protocol passes may refill for 12 months if within 3 months of last provider visit (or a total of 15 months).             Passed - TSH on file in past 12 months for patient age 12 & older     TSH   Date Value Ref Range Status   03/21/2019 0.59 0.30 - 5.00 uIU/mL Final                   aspirin 81 MG EC tablet [Pharmacy Med Name: Aspirin Low Dose Oral Tablet Delayed Release 81  MG] 30 tablet 0     Sig: Take 1 tablet by mouth daily.       Aspirin/Dipyridamole Refill Protocol Passed - 11/27/2019 10:30 AM        Passed - PCP or prescribing provider visit in past 12 months       Last office visit with prescriber/PCP: 10/29/2019 Main Lowery MD OR same dept: 10/29/2019 Main Lowery MD OR same specialty: 10/29/2019 Main Lowery MD  Last physical: 3/21/2019 Last MTM visit: Visit date not found    Next appt within 3 mo: 11/27/2019 Main Lowery MD Next physical within 3 mo: Visit date not found  Prescriber OR PCP: Main Lowery MD  Last diagnosis associated with med order: 1. Postoperative hypothyroidism  - levothyroxine (SYNTHROID, LEVOTHROID) 150 MCG tablet [Pharmacy Med Name: Levothyroxine Sodium Oral Tablet 150 MCG]; Take 1 tablet (150 mcg total) by mouth Daily at 6:00 am.  Dispense: 30 tablet; Refill: 0    2. HTN (hypertension)  - aspirin 81 MG EC tablet [Pharmacy Med Name: Aspirin Low Dose Oral Tablet Delayed Release 81 MG]; Take 1 tablet by mouth daily.  Dispense: 30 tablet; Refill: 0    If protocol passes may refill for 6 months if within 3 months of last provider visit (or a total of 9 months).

## 2021-06-04 NOTE — TELEPHONE ENCOUNTER
Spoke with Marin, pharmacist, who states that they received a prescription today from Dr. Toledo as well with the updated prescription that he would like the patient to take.  Marin is going to discontinue the prescription from Dr. Castillo as it was Dr. Toledo who prescribed the medication.  Kavitha JIMENEZ CMA/KELVIN....................11:18 AM

## 2021-06-04 NOTE — TELEPHONE ENCOUNTER
Spoke with the patient and relayed message below from Dr. Castillo.  He verbalized understanding and had no further questions at this time.  Prescription has been set up for Dr. Castillo to review.  Kavitha JIMENEZ, NATASHA/CMT....................9:37 AM

## 2021-06-04 NOTE — TELEPHONE ENCOUNTER
Medication Question or Clarification  Who is calling: Pharmacy: [facility]  What medication are you calling about? (include dose and sig)   tamsulosin (FLOMAX) 0.4 mg cap 90 capsule 1 12/30/2019     Sig - Route: Take 1 capsule (0.4 mg total) by mouth daily after supper. - Oral    Sent to pharmacy as: tamsulosin 0.4 mg capsule (FLOMAX)    E-Prescribing Status: Receipt confirmed by pharmacy (12/30/2019  9:35 AM CST        Who prescribed the medication?: Main Lowery MD    What is your question/concern?: Received this refill authorization . Patient has on file at the pharmacy that he is taking 2 pills daily, and that prescription is filed by Dr Toledo, Urology, and this office also authorized a refill.  Please clarify dose.  Pharmacy: [id number]  Okay to leave a detailed message?: Yes  Site CMT - Please call the pharmacy to obtain any additional needed information.

## 2021-06-05 VITALS
BODY MASS INDEX: 26.16 KG/M2 | HEIGHT: 69 IN | HEART RATE: 91 BPM | WEIGHT: 176.6 LBS | OXYGEN SATURATION: 98 % | TEMPERATURE: 96.4 F | DIASTOLIC BLOOD PRESSURE: 64 MMHG | SYSTOLIC BLOOD PRESSURE: 136 MMHG | RESPIRATION RATE: 20 BRPM

## 2021-06-05 NOTE — TELEPHONE ENCOUNTER
RN cannot approve Refill Request    RN can NOT refill this medication med is not covered by policy/route to provider     . Last office visit: 11/27/2019 Main Lowery MD Last Physical: 3/21/2019 Last MTM visit: Visit date not found Last visit same specialty: 11/27/2019 Main Lowery MD.  Next visit within 3 mo: Visit date not found  Next physical within 3 mo: Visit date not found      Angelia Sosa, [facility] Triage/Med Refill 1/27/2020    Requested Prescriptions   Pending Prescriptions Disp Refills     FISH -1,000 mg cap [Pharmacy Med Name: Fish Oil Oral Capsule 1000 MG] 30 capsule 1     Sig: Take 1 capsule (1 g total) by mouth daily.       There is no refill protocol information for this order

## 2021-06-07 NOTE — TELEPHONE ENCOUNTER
Received fax [facility] from Jac[facility]. It appears patient doesn't have current imaging, doesn't want to be seen until July (our summer schedules haven't been released yet) and patient will not have health coverage until July. If he is scheduled prior to having insurance coverage, he'll be self pay.

## 2021-06-07 NOTE — TELEPHONE ENCOUNTER
I spoke with patient. He isn't interested in an appointment or scheduling imaging until he has insurance. Patient stated he will call us back once he has insurance.

## 2021-06-08 NOTE — PROGRESS NOTES
[facility] Certification Request    January 30, 2017      Patient: Jamar Lorenzo  MR Number: [id number]  YOB: 1955  Date of Visit: 1/30/2017      Dear Dr. Main Lowery:    Thank you for this referral.   We are seeing Jamar Lorenzo in Occupational Therapy for stroke rehab.    Medicare and/or Medicaid requires physician review and approval of the treatment plan. Please review the plan of care and verify that you agree with the therapy plan of care by co-signing this note.      Plan of Care  Authorization / Certification Start Date: 01/30/17  Authorization / Certification End Date: 04/30/17  Authorization / Certification Number of Visits: 12  Communication with: Referral Source  Patient Related Instruction: Nature of Condition;Treatment plan and rationale;Basis of treatment;Expected outcome  Times per Week: 1  Number of Weeks: 12  Number of Visits: 12  Therapeutic Exercise: Strengthening  Neuromuscular Reeducation: stroke rehabilitation  Functional Training (ADL's): self care;ADL's;meal prep    Goals:  Pt. will demonstrate/verbalize independence in self-management of condition in : 6 weeks  Patient will perform hand functions of: gripping;holding;for lifting;for carrying;for dressing;with less difficulty  Pt will: be able to work with less difficulty in 12 weeks.      If you have any questions or concerns, please don't hesitate to call.    Sincerely,      Elif Snyder, OT        Physician recommendation:     ___ Follow therapist's recommendation        ___ Modify therapy      *Physician co-signature indicates they certify the need for these services furnished within this plan and while under their care.      [facility]   ADL Initial Evaluation    Patient Name: Jamar Lorenzo  Date of evaluation: 1/30/2017  Referral Diagnosis: stroke  Referring provider: Main Lowery MD  Visit Diagnosis:     ICD-10-CM    1. Stiffness in joint M25.60    2. Pain in right hand  M79.641    3. Decreased activities of daily living (ADL) Z78.9        Assessment:      Pt. is appropriate for skilled OT intervention as outlined in the Plan of Care (POC).    Goals:  Pt. will demonstrate/verbalize independence in self-management of condition in : 6 weeks  Patient will perform hand functions of: gripping;holding;for lifting;for carrying;for dressing;with less difficulty  Pt will: be able to work with less difficulty in 12 weeks.    Patient's expectations/goals are realistic.    Barriers to Learning or Achieving Goals:  No Barriers.       Plan / Patient Instructions:        Plan of Care:   Authorization / Certification Start Date: 17  Authorization / Certification End Date: 17  Authorization / Certification Number of Visits: 12  Communication with: Referral Source  Patient Related Instruction: Nature of Condition;Treatment plan and rationale;Basis of treatment;Expected outcome  Times per Week: 1  Number of Weeks: 12  Number of Visits: 12  Therapeutic Exercise: Strengthening  Neuromuscular Reeducation: stroke rehabilitation  Functional Training (ADL's): self care;ADL's;meal prep    Plan for next visit: Progress HEP     Subjective:        Social information:   Living Situation:single family home with family   Occupation: nursing assistant   Work Status:Working part time   Equipment Available: None    History of Present Illness:    Jamar is a 61 y.o. male who presents to therapy today with complaints of tightness, weakness and incoordination in right wrist and hand. Date of onset/duration of symptoms is a year and a half ago after he had a stroke. Onset was sudden. Symptoms are constant.  He describes their previous level of function as not limited prior to his stroke.    Pain Ratin  Pain description: aching, dull and pain    Functional limitations are described as occurring with:   gripping, holding and manipulating fingers   Lifting and carrying  work       Objective:      Note: Items  left blank indicates the item was not performed or not indicated at the time of the evaluation.    Patient Outcome Measures: None taken    ADL Examination  1. Stiffness in joint     2. Pain in right hand     3. Decreased activities of daily living (ADL)       Precautions/Restrictions: None  Involved side: Right  Posture Observation:      General standing posture is fair.    Prior Level of Function: same                            Current level of function           I  SBA  MIN  MOD MAX DEP   Equipment   Transfers  x         Bed x         Toilet  x         Tub/shower x         Car  x         Bed mobility x         ADL's          Eating x         Grooming x         Bathing x         UE dressing x         LE dressing x         Toileting x         IADL's          Meal prep x         House work x         Laundry  x         Telephone x         Driving x                N/A   Finances x         Outdoor ADL x         Grocery shop x         Medication mgmt x         Writing x         Typing  x      Uses left hand only   Dynamic sitting x         Dynamic standing x             Upper Extremity ROM:              Right       Left     * indicates pain   AROM   AROM     Shoulder Flexion 180   WNL   WNL     Shoulder Extension  60    WNL   WNL     Shoulder Abduction  180    WNL   WNL     Elbow Flexion  150    WNL   WNL     Elbow Extension 0    WNL   WNL     Forearm Supination  80    WNL   WNL     Forearm Pronation  80    WNL   WNL     Wrist Flexion  80    90   90     Wrist Extension  80    21   45      Strength 70 105     3 point pinch  16 18     Lateral pinch    21   32     Sensation:  Patient reports normal    Coordination:                           Right                                           Left                    NT   WNL   Impaired    NT     WNL    Impaired     Gross motor      x         x        Fine motor         x      x        9 hole peg   x         x              Hand AROM:  n                           Right                                           Left                    NT   WNL   Impaired    NT     WNL    Impaired     Claw fist         x      x        Flat fist         x      x        Full fist      x         x        LE Orthoses:   No    UE Orthoses:  No    Cognitive Evaluation:  Cognitive evaluation not indicated at this time    Visual - perceptual Assessment:  Visual-perceptual assessment not indicated.    Pre-Driving Screening:  Not Indicated    Treatment Today: Instructed on prolonged stretch to wrist flexors and intrinsic hand muscles. Patient also instructed on hand coordination exercises.       TREATMENT MINUTES COMMENTS   Evaluation 20    Self-care/ Home management     Manual therapy     Neuromuscular Re-education     Therapeutic Exercises 25    Orthotic fitting           Total 45    Blank areas are intentional and mean the treatment did not include these items.     OT Evaluation Code: (Please list factors)   Comorbidities: None  Profile/History Review: Brief    Need for eval modification: No    # Treatment options: Limited    Clinical Decision Making:  Low      Occupational Profile/ Medical and Therapy History and Comorbidities Occupational Performance Clinical Decision Making   (Complexity)   brief history with review of medical/therapy records related to the presenting problem.  No comorbidities 1-3 Performance deficits that result in activity limitations and/or participation restrictions.    No Assessment Modification  Low complexity, which includes  problem-focused assessments, and consideration of a limited number of treatment options.      expanded review of medical/therapy records and additional review of physical, cognitive and psychosocial history.    May have comorbidities 3-5 Performance deficits that result in activity limitations and/or participation restrictions.    Minimal to moderate modification of assessment Moderate complexity, which includes analysis of data from detailed  assessments, and consideration of several treatment options.         Review of medical/therapy records and extensive additional review of physical, cognitive and psychosocial history.  Comorbidities affect occupational performance 5 or more Performance deficits that result in activity limitations and/or participation restrictions.    Significant modification of assessment High complexity, analysis of  Occupational profile and data,  Comprehensive assessments, multiple treatment options.          Elif Snyder  1/30/2017  12:57 PM

## 2021-06-08 NOTE — PROGRESS NOTES
Office Visit - Follow Up   Jamar Lorenzo   61 y.o. male    Date of Visit: 1/19/2017    Chief Complaint   Patient presents with     Diabetes        Assessment and Plan   1. Diabetes mellitus  Well-controlled, continue metformin, atorvastatin, aspirin, clopidogrel, losartan, annual eye exam, excellent foot care which is discussed today  - HM2(CBC w/o Differential)  - Lipid Cascade  - Basic Metabolic Panel  - Glycosylated Hemoglobin A1c    2. Essential hypertension  Blood pressure controlled, continue current medications    3. Postoperative hypothyroidism  Continue levothyroxine  - Thyroid Stimulating Hormone (TSH)    4. Cerebrovascular accident (CVA), unspecified mechanism  We will have him do some hand therapy.  He also thinks tizanidine helps  - Ambulatory referral to PT/OT      The following high BMI interventions were performed this visit: encouragement to exercise and lifestyle education regarding diet    Return in about 3 months (around 4/19/2017) for recheck.     History of Present Illness   This 61 y.o. old and comes in for follow-up of diabetes, hypertension, hyperlipidemia, hypothyroidism and stroke syndrome.  He's generally been feeling well.  He was seen in the emergency room in December after he checked his blood pressure at home and it was high.  Subsequent checking emergency room was generally unremarkable and he was discharged, I believe with an antibiotic for an upper respiratory infection.  He never did take the antibiotic.  He is feeling well now and his blood pressure has been normal with checks at home.  He is back at work.  He is working overnight.  He has some contractures in his hand difficulty straightening his fingers likely result of his stroke.     Review of Systems: A comprehensive review of systems was negative except as noted.     Medications, Allergies and Problem List   Reviewed and updated     Physical Exam   General Appearance:   No acute distress    Visit Vitals     /70  "(Patient Site: Left Arm, Patient Position: Sitting, Cuff Size: Adult Regular)     Pulse 98     Ht 5' 9\" (1.753 m)     Wt 170 lb (77.1 kg)     SpO2 96%     BMI 25.1 kg/m2       HEENT exam is unremarkable, neck supple, no cervical lymphadenopathy, cardiovascular regular rate and rhythm no murmur gallop or rub, lungs are clear to auscultation bilaterally, abdomen soft nontender nondistended no organomegaly, neurologic exam is nonfocal, psychiatric pleasant, no confusion or agitation        Additional Information   Current Outpatient Prescriptions   Medication Sig Dispense Refill     amLODIPine (NORVASC) 10 MG tablet Take 1 tablet (10 mg total) by mouth daily. 90 tablet 3     aspirin 81 MG EC tablet Take 1 tablet (81 mg total) by mouth daily. 90 tablet 3     atorvastatin (LIPITOR) 40 MG tablet Take 1 tablet (40 mg total) by mouth daily. 90 tablet 3     clopidogrel (PLAVIX) 75 mg tablet Take 1 tablet (75 mg total) by mouth daily. 90 tablet 3     levothyroxine (SYNTHROID, LEVOTHROID) 175 MCG tablet Take 1 tablet (175 mcg total) by mouth Daily at 6:00 am. 90 tablet 3     losartan (COZAAR) 100 MG tablet Take 1 tablet (100 mg total) by mouth daily. 90 tablet 3     metFORMIN (GLUCOPHAGE-XR) 500 MG 24 hr tablet Take 1 tablet (500 mg total) by mouth 2 (two) times a day with meals. 180 tablet 3     polyvinyl alcohol (LIQUIFILM TEARS) 1.4 % ophthalmic solution Administer 1 drop to both eyes 4 (four) times a day as needed for dry eyes.       senna-docusate (PERICOLACE) 8.6-50 mg tablet Take 1-2 tablets by mouth as needed.       tamsulosin (FLOMAX) 0.4 mg Cp24 Take 1 capsule (0.4 mg total) by mouth daily after supper. 90 capsule 3     tiZANidine (ZANAFLEX) 4 MG tablet Take 1 tablet (4 mg total) by mouth 2 (two) times a day as needed. 60 tablet 3     triamcinolone (KENALOG) 0.1 % cream Use once daily as needed. 30 g 2     No current facility-administered medications for this visit.      Allergies   Allergen Reactions     " Sulfamethoxazole-Trimethoprim Rash     Inflamed and painful      Social History   Substance Use Topics     Smoking status: Never Smoker     Smokeless tobacco: Never Used     Alcohol use 0.6 oz/week     1 Glasses of wine per week      Comment: once per week       Review and/or order of clinical lab tests:  Review and/or order of radiology tests:  Review and/or order of medicine tests:  Discussion of test results with performing physician:  Decision to obtain old records and/or obtain history from someone other than the patient:  Review and summarization of old records and/or obtaining history from someone other than the patient and.or discussion of case with another health care provider:  Independent visualization of image, tracing or specimen itself:    Time:      Main Lowery MD

## 2021-06-09 NOTE — PROGRESS NOTES
[facility] Discharge Summary  Patient Name: Jamar Lorenzo  Date: 3/23/2017  Referral Diagnosis: stroke  Referring provider: Main Lowery MD  Visit Diagnosis:   1. Stiffness in joint     2. Pain in right hand     3. Decreased activities of daily living (ADL)         Goal Status: Not Met  Patient will be able to: gripping;holding;for lifting;for carrying;for dressing;with less difficulty      Patient was seen for 2 visits between 1-30-17 and 2-20-17.    Therapy will be discontinued at this time.  The patient will need a new referral to resume.    Thank you for your referral.  Elif Snyder  3/23/2017  8:41 AM

## 2021-06-09 NOTE — PROGRESS NOTES
[facility] Daily Progress     Patient Name: Jamar Lorenzo  Date: 2/20/2017  Visit #: 2  Referral Diagnosis: stroke  Referring provider: Main Lowery MD  Visit Diagnosis:     ICD-10-CM    1. Stiffness in joint M25.60    2. Pain in right hand M79.641    3. Decreased activities of daily living (ADL) Z78.9        Assessment:     Patient is appropriate to continue with skilled occupational therapy intervention, as indicated by initial plan of care.    Goal Status:  Patient will be able to: gripping;holding;for lifting;for carrying;for dressing;with less difficulty    Plan / Patient Education:     Continue with initial plan of care.  Progress with home program as tolerated.    Subjective:     Pain rating at rest: 5  Pain rating with activity: 7    Objective:     Treatment Today: Reviewed HEP. Patient not performing exercises correctly. Discussed and practiced these. Added  and pinch strength exercises as well as coordination and AROM exercises.  TREATMENT MINUTES COMMENTS   Evaluation     Self-care/ Home management     Manual therapy     Neuromuscular Re-education     Therapeutic Exercises 25    Iontophoresis     Orthotic Fitting     Total 25    Blank areas are intentional and mean the treatment did not include these items.       Elif Snyder  2/20/2017  9:39 AM

## 2021-06-10 NOTE — PROGRESS NOTES
Office Visit - Follow Up   Jamar Lorenzo   61 y.o. male    Date of Visit: 4/19/2017    Chief Complaint   Patient presents with     3 month f/u        Assessment and Plan   1. Type 2 diabetes mellitus without complication, without long-term current use of insulin  Diabetes well controlled, continue metformin, aspirin, Plavix, statin, losartan, annual eye exam and excellent diabetic foot care  - Glycosylated Hemoglobin A1c    2. Benign prostatic hyperplasia with lower urinary tract symptoms, unspecified morphology  Having some urinary symptoms, increase Flomax 0.8 mg, consider urology referral    3. Essential hypertension  Blood pressure upper limit of normal, continue current medications  - Basic Metabolic Panel    4. Postoperative hypothyroidism  Continue levothyroxine  - Thyroid Stimulating Hormone (TSH)    5. Cerebrovascular accident (CVA), unspecified mechanism  Continue prevention with aspirin, Plavix, statin, excellent blood pressure control  - HM2(CBC w/o Differential)    The following high BMI interventions were performed this visit: encouragement to exercise and lifestyle education regarding diet    Return in about 3 months (around 7/19/2017) for diabetes follow up.     History of Present Illness   This 61 y.o. old man comes in for follow-up of numerous medical problems.  Generally he has been feeling okay.  Occasionally he gets some cramping in his right arm.  He relates these to his stroke syndrome.  No new neurological deficits.  No chest pain no shortness of breath.  He he does have a l low urinary stream.  Wondering if he can increase the Flomax.  No chest pain no shortness of breath, no nausea vomiting diarrhea.    Review of Systems: A comprehensive review of systems was negative except as noted.     Medications, Allergies and Problem List   Reviewed and updated     Physical Exam   General Appearance:   No acute distress    /80 (Patient Site: Left Arm, Patient Position: Sitting, Cuff Size:  "Adult Regular)  Pulse 76  Ht 5' 9\" (1.753 m)  Wt 175 lb 12 oz (79.7 kg)  BMI 25.95 kg/m2    HEENT exam is unremarkable, neck supple, no cervical lymphadenopathy, cardiovascular regular rate and rhythm no murmur gallop or rub, lungs are clear to auscultation bilaterally, abdomen soft nontender nondistended no organomegaly, neurologic exam is nonfocal, psychiatric pleasant, no confusion or agitation        Additional Information   Current Outpatient Prescriptions   Medication Sig Dispense Refill     amLODIPine (NORVASC) 10 MG tablet Take 1 tablet (10 mg total) by mouth daily. 90 tablet 3     aspirin 81 MG EC tablet Take 1 tablet (81 mg total) by mouth daily. 90 tablet 3     atorvastatin (LIPITOR) 40 MG tablet Take 1 tablet (40 mg total) by mouth daily. 90 tablet 3     clopidogrel (PLAVIX) 75 mg tablet Take 1 tablet (75 mg total) by mouth daily. 90 tablet 3     levothyroxine (SYNTHROID, LEVOTHROID) 175 MCG tablet Take 1 tablet (175 mcg total) by mouth Daily at 6:00 am. 90 tablet 3     losartan (COZAAR) 100 MG tablet Take 1 tablet (100 mg total) by mouth daily. 90 tablet 3     metFORMIN (GLUCOPHAGE-XR) 500 MG 24 hr tablet Take 1 tablet (500 mg total) by mouth 2 (two) times a day with meals. 180 tablet 3     tamsulosin (FLOMAX) 0.4 mg Cp24 Take 2 capsules (0.8 mg total) by mouth daily after supper. 180 capsule 3     triamcinolone (KENALOG) 0.1 % cream Use once daily as needed. 30 g 2     No current facility-administered medications for this visit.      Allergies   Allergen Reactions     Sulfamethoxazole-Trimethoprim Rash     Inflamed and painful      Social History   Substance Use Topics     Smoking status: Never Smoker     Smokeless tobacco: Never Used     Alcohol use 0.6 oz/week     1 Glasses of wine per week      Comment: once per week       Review and/or order of clinical lab tests:  Review and/or order of radiology tests:  Review and/or order of medicine tests:  Discussion of test results with performing " physician:  Decision to obtain old records and/or obtain history from someone other than the patient:  Review and summarization of old records and/or obtaining history from someone other than the patient and.or discussion of case with another health care provider:  Independent visualization of image, tracing or specimen itself:    Time:      Main Lowery MD

## 2021-06-11 NOTE — PROGRESS NOTES
Office Visit - Follow Up   Jamar Lorenzo   61 y.o. male    Date of Visit: 7/20/2017    Chief Complaint   Patient presents with     Diabetes        Assessment and Plan   1. Type 2 diabetes mellitus without complication, without long-term current use of insulin  Has been well-controlled, continue metformin, aspirin, Plavix, no need to continue Plavix as is been much greater than 3 months since his stroke.  He will continue aspirin.  - Glycosylated Hemoglobin A1c    2. Essential hypertension  Controlled continue current medication  - Potassium  - Creatinine    3. Postoperative hypothyroidism  Continue    4. Cerebrovascular accident (CVA), unspecified mechanism  See above    5. Numbness of right hand  This may be multifactorial, clear evidence of carpal tunnel syndrome but he also has some neck pain which is radicular down his arm he might have some severe cervical radiculopathy  - EMG; Future    6. Right elbow pain  He is tender along the right elbow including range of motion to be related to neck and carpal tunnel but may be intrinsic to the elbow  - EMG; Future  - XR Elbow Right 3 or More VWS; Future    7. Neck pain  - EMG; Future  - XR Cervical Spine 2 - 3 VWS; Future    The following high BMI interventions were performed this visit: encouragement to exercise and lifestyle education regarding diet    Return in about 3 months (around 10/20/2017) for recheck.     History of Present Illness   This 61 y.o. old man comes in for follow-up of diabetes, hypertension, hyperlipidemia, evaluation of right arm pain and numbness.  Overall he has been doing quite well.  His main issue is that his right elbow hurts his right hand becomes numb and he also has some pain in his neck.  Occasional he will have some tingling in his toes.  The right hand pain seems to be worse with activity with arm elevation.  His elbow pain is worse after a day of work.  His neck pain hurts when he turns to the right.  He is worried about a stroke  "as he is previously had one affecting the right side of his body.    Review of Systems: A comprehensive review of systems was negative except as noted.     Medications, Allergies and Problem List   Reviewed and updated     Physical Exam   General Appearance:   No acute distress    /80 (Patient Site: Left Arm, Patient Position: Sitting, Cuff Size: Adult Regular)  Pulse 79  Ht 5' 9\" (1.753 m)  Wt 173 lb (78.5 kg)  SpO2 98%  BMI 25.55 kg/m2    HEENT exam is unremarkable neck with decreased range of motion especially looking to the right and this does provoke pain into his right arm.  He has pain with palpation about the elbow and slight decrease in extension of the elbow.  No obvious swelling or deformity.  I am able to provoke numbness in his hand by raising his hand and bending his wrist.  This is in the distribution of the median nerve.  He is hyperreflexic on the right compared to the left in both the upper and lower extremities but he does have a history of a stroke affecting his right side.  No clonus.       Additional Information   Current Outpatient Prescriptions   Medication Sig Dispense Refill     amLODIPine (NORVASC) 10 MG tablet Take 1 tablet (10 mg total) by mouth daily. 90 tablet 3     aspirin 81 MG EC tablet Take 1 tablet (81 mg total) by mouth daily. 90 tablet 3     atorvastatin (LIPITOR) 40 MG tablet Take 1 tablet (40 mg total) by mouth daily. 90 tablet 3     clopidogrel (PLAVIX) 75 mg tablet Take 1 tablet (75 mg total) by mouth daily. 90 tablet 3     levothyroxine (SYNTHROID, LEVOTHROID) 175 MCG tablet Take 1 tablet (175 mcg total) by mouth Daily at 6:00 am. 90 tablet 3     losartan (COZAAR) 100 MG tablet Take 1 tablet (100 mg total) by mouth daily. 90 tablet 3     metFORMIN (GLUCOPHAGE-XR) 500 MG 24 hr tablet Take 1 tablet (500 mg total) by mouth 2 (two) times a day with meals. 180 tablet 3     tamsulosin (FLOMAX) 0.4 mg Cp24 Take 2 capsules (0.8 mg total) by mouth daily after supper. 180 " capsule 3     triamcinolone (KENALOG) 0.1 % cream Use once daily as needed. 30 g 2     No current facility-administered medications for this visit.      Allergies   Allergen Reactions     Sulfamethoxazole-Trimethoprim Rash     Inflamed and painful      Social History   Substance Use Topics     Smoking status: Never Smoker     Smokeless tobacco: Never Used     Alcohol use 0.6 oz/week     1 Glasses of wine per week      Comment: once per week       Review and/or order of clinical lab tests:  Review and/or order of radiology tests:  Review and/or order of medicine tests:  Discussion of test results with performing physician:  Decision to obtain old records and/or obtain history from someone other than the patient:  Review and summarization of old records and/or obtaining history from someone other than the patient and.or discussion of case with another health care provider:  Independent visualization of image, tracing or specimen itself:    Time:      Main Lowery MD

## 2021-06-12 NOTE — PROGRESS NOTES
Office Visit - Follow Up   Jamar Lorenzo   61 y.o. male    Date of Visit: 8/3/2017    Chief Complaint   Patient presents with     Results     EMG        Assessment and Plan   1. Right arm pain  EMG negative for median neuropathy or cervical radiculopathy, check MRI of the cervical spine  - MR Cervical Spine Without Contrast; Future    2. Neck pain  - MR Cervical Spine Without Contrast; Future    3. Essential hypertension  Pressure controlled continue current medication    4. Cerebrovascular accident (CVA), unspecified mechanism  Stop Plavix continue aspirin and statin    5. Type 2 diabetes mellitus without complication, without long-term current use of insulin  Well-controlled continue current medications    Return in about 3 months (around 11/3/2017) for recheck.     History of Present Illness   This 61 y.o. old man comes in for follow-up of recent visit.  He had an EMG of the right upper extremity that did not show any median neuropathy or any cervical radiculopathy.  He has hyperreflexive on the right but did have a stroke affecting his right side and Dr. Maldonado's felt that he may have EMG negative cervical radiculopathy.  He is having neck pain especially when he extends his neck to the right.  The pain also radiates down his arm when he does this.  He is still has paresthesias in his right hand.  We did review his other lab result occluding excellent hemoglobin A1c of 6.7% normal potassium and normal creatinine.  We reviewed the x-ray of his cervical spine which does show osteoarthritic changes.  We also reviewed that he should not be on both aspirin and Plavix as he has well out from his stroke.    Review of Systems: A comprehensive review of systems was negative except as noted.     Medications, Allergies and Problem List   Reviewed and updated     Physical Exam   General Appearance:   No acute distress    /72 (Patient Site: Left Arm, Patient Position: Sitting, Cuff Size: Adult Regular)  Pulse 83   "Ht 5' 9\" (1.753 m)  Wt 172 lb (78 kg)  SpO2 96%  BMI 25.4 kg/m2    Cardiovascular regular rate and rhythm no murmur gallop or rub lungs clear to auscultation bilaterally he does have some motor deficit on the right he is hyperreflexic at the right bicep tendon and brachioradialis, slightly abnormal sensation in the right hand he has pain with range of motion of the neck especially on the right arm     Additional Information   Current Outpatient Prescriptions   Medication Sig Dispense Refill     amLODIPine (NORVASC) 10 MG tablet Take 1 tablet (10 mg total) by mouth daily. 90 tablet 3     aspirin 81 MG EC tablet Take 1 tablet (81 mg total) by mouth daily. 90 tablet 3     atorvastatin (LIPITOR) 40 MG tablet Take 1 tablet (40 mg total) by mouth daily. 90 tablet 3     levothyroxine (SYNTHROID, LEVOTHROID) 175 MCG tablet Take 1 tablet (175 mcg total) by mouth Daily at 6:00 am. 90 tablet 3     losartan (COZAAR) 100 MG tablet Take 1 tablet (100 mg total) by mouth daily. 90 tablet 3     metFORMIN (GLUCOPHAGE-XR) 500 MG 24 hr tablet Take 1 tablet (500 mg total) by mouth 2 (two) times a day with meals. 180 tablet 3     tamsulosin (FLOMAX) 0.4 mg Cp24 Take 2 capsules (0.8 mg total) by mouth daily after supper. 180 capsule 3     triamcinolone (KENALOG) 0.1 % cream Use once daily as needed. 30 g 2     No current facility-administered medications for this visit.      Allergies   Allergen Reactions     Sulfamethoxazole-Trimethoprim Rash     Inflamed and painful      Social History   Substance Use Topics     Smoking status: Never Smoker     Smokeless tobacco: Never Used     Alcohol use 0.6 oz/week     1 Glasses of wine per week      Comment: once per week       Review and/or order of clinical lab tests:  Review and/or order of radiology tests:  Review and/or order of medicine tests:  Discussion of test results with performing physician:  Decision to obtain old records and/or obtain history from someone other than the " patient:  Review and summarization of old records and/or obtaining history from someone other than the patient and.or discussion of case with another health care provider:  Independent visualization of image, tracing or specimen itself:    Time:      Main Lowery MD

## 2021-06-12 NOTE — PROGRESS NOTES
Patient presents at the request of Dr. Main Lowery for right upper extremity EMG.  He has a 2-3 month history of right sided neck and arm pain with numbness and tingling into all digits.  Worse with movement of his neck.  He is left-handed.  History of stroke affecting his right side.  On exam hyperreflexia right upper extremity with Mcintyre's no weakness.    EMG/NCS  results: Please see scanned full report    Comment NCS: Normal study  1.  Normal nerve conduction studies right upper extremity.    Comment EMG: Normal study  1.  Normal needle EMG right upper extremity.    Interpretation: Normal study    1. There is no electrodiagnostic evidence of cervical radiculopathy, brachial plexopathy, or focal neuropathy in the right upper extremity.      Note: He has hyperreflexia of the right upper extremity.  This is likely related to his history of stroke.  Given his he has worsening pain and symptoms with looking up/movement of his neck, this could represent EMG negative cervical radiculopathy.  Can consider further imaging of the cervical spine such as MRI if indicated clinically.      The testing was completed in its entirety by the physician.       It was our pleasure caring for your patient today, if there any questions or concerns please do not hesitate to contact us.

## 2021-06-12 NOTE — PROGRESS NOTES
Neurosurgery consultation was requested by: Dr. Main Lowery   Pain: Neck pain   Radicular Pain is present: Radiates into left shoulder and left upper arm   Lhermitte sign:   Motor complaints: Denies weakness  Sensory complaints: Numbness in 2nd 3rd and 4th finger on right hand, states from a stroke   Gait and balance issues: Yes  Bowel or bladder issues: Denies   Duration of SX is: 12/2016  The symptoms are worse with: Numbness in right hand when put on a table and left arm when lifting something   The symptoms are better with: Rest  Injury:Denies  Severity is: Mild - moderate  Patient has tried the following conservative measures: Pt does PT for right hand and does help  NDI score is :   NATASHA Blunt

## 2021-06-12 NOTE — PROGRESS NOTES
ASSESSMENT: Jamar Lorenzo is a 61 y.o. male presents for consultation at the request of [facility] Neurosurgeon Dr. Turner, with past medical history significant for DM, HTN, Hypothyroidism, BPH, Stroke, thyroidectomy, who presents today for new patient evaluation of acute on chronic left cervical radiculitis in a C5 dermatomal pattern that is been severe for the last 2 months.  He does also note paresthesias in the right arm C7 dermatomal pattern.    He is neurologically intact on exam today. Spurling maneuver does elicit numbness and tingling on the right however does not elicit pain on the left.  No myelopathic symptoms noted.    Patient denies neck pain, therefore unlikely facet mediated type pain.    NDI Score: 22     WHO-5: 19     PHQ-2: 0    Diagnoses and all orders for this visit:    Cervical nerve root compression  -     Ambulatory referral to PT/OT  -     gabapentin (NEURONTIN) 300 MG capsule; Take 1 capsule (300 mg total) by mouth 3 (three) times a day. Follow Gabapentin Dosing chart given  Dispense: 90 capsule; Refill: 3    Numbness of right hand  -     Ambulatory referral to PT/OT    Radiculitis of left cervical region  -     Ambulatory referral to PT/OT  -     gabapentin (NEURONTIN) 300 MG capsule; Take 1 capsule (300 mg total) by mouth 3 (three) times a day. Follow Gabapentin Dosing chart given  Dispense: 90 capsule; Refill: 3     PLAN:  Reviewed spine anatomy and disease process. Discussed diagnosis and treatment options with the patient today. A shared decision making model was used. The patient's values and choices were respected. The following represents what was discussed and decided upon by the provider and the patient.     -DIAGNOSTIC TESTS:  Images were personally reviewed and interpreted.    --Reviewed Cervical Spine MRI 8/17/2017 that was limited with sagittal images only as he could not finish the exam due to pain.  There is degenerative disc disease C3 through C7 with disc osteophyte complex also  C3-7 most significant spinal canal stenosis at C4-5.  Suspect significant neuroforaminal stenosis on the right at C3-4, C4-5, C5-6 and high-grade on the left at C4-5 and C5-6.  --EMG with Dr. Pak 8/2/2017 right upper extremity within normal limits.    -PHYSICAL THERAPY: Referral to physical therapy also sent for cervical core strengthening and nerve glide exercises as well as pain relief.  Discussed the importance of core strengthening, ROM, stretching exercises with the patient and how each of these entities is important in decreasing pain.  Explained to the patient that the purpose of physical therapy is to teach the patient a home exercise program.  These exercises need to be performed every day in order to decrease pain and prevent future occurrences of pain.  Likened it to brushing one's teeth.      -MEDICATIONS: Gabapentin 300 mg 1 tablet 3 times daily for radicular pain.  -Advised to use Tylenol 500 mg 1 tablet twice daily as needed as well for breakthrough pain.  -Not a good candidate for NSAID therapy due to history of stroke, diabetes and hypertension.  Discussed side effects of medications and proper use. Patient verbalized understanding.    -INTERVENTIONS: Ordered C7-T1 interlaminar EVELYNE today to see if we get further relief of his left cervical radiculitis C5 dermatomal pattern, as well as paresthesias right arm in a C7 dermatomal pattern.  --If cervical injection not beneficial would recommend cervical flexion-extension MRI versus obtaining axial images only that were not able to can be completed previously.    -PATIENT EDUCATION: 40 minutes of total visit time was spent face to face with the patient today, 60 % of the visit was spent on counseling, education, and coordinating care.   -5 minutes spent outside of visit time, non-face-to-face time, reviewing chart.    -FOLLOW-UP:   Follow-up for injection today with Dr. Chand than 2 weeks postinjection.    Advised pt to call the [facility] if  symptoms worsen or you have problems controlling bladder and bowel function.   ______________________________________________________________________    SUBJECTIVE:   Jamar Lorenzo  is a 61 y.o. male who presents today for new patient evaluation of most significant left trapezius, anterior deltoid and anterior bicep pain without any further arm pain that is chronic in nature however significant ×2 months in which he currently rates his pain a 7/10 worse with movement of his neck.  He does report also numbness and tingling on the right arm into the second third and fourth fingers ongoing since February 2017 that is also worse with moving his neck.    Patient denies any upper extremity weakness, denies fine motor skill difficulty, denies bowel or bladder dysfunction, denies balance changes or recent trips or falls.    **Patient was evaluated by Dr. Turner on 8/31/2017 for his spinal canal stenosis at L4-5 and left foraminal narrowing, she recommended consultation here at the spine center for injection and conservative management.    No myelopathic or red flag symptoms.     Treatment to Date: No prior spinal surgery or spinal injection.  No prior physical therapy for cervical spine issues.  However he did have therapy 2015 post stroke.    Medications:  Not currently take medications for his pain.    Current Outpatient Prescriptions on File Prior to Encounter   Medication Sig Dispense Refill     amLODIPine (NORVASC) 10 MG tablet Take 1 tablet (10 mg total) by mouth daily. 90 tablet 3     aspirin 81 MG EC tablet Take 1 tablet (81 mg total) by mouth daily. 90 tablet 3     atorvastatin (LIPITOR) 40 MG tablet Take 1 tablet (40 mg total) by mouth daily. 90 tablet 3     levothyroxine (SYNTHROID, LEVOTHROID) 175 MCG tablet Take 1 tablet (175 mcg total) by mouth Daily at 6:00 am. 90 tablet 3     losartan (COZAAR) 100 MG tablet Take 1 tablet (100 mg total) by mouth daily. 90 tablet 3     metFORMIN (GLUCOPHAGE-XR) 500 MG 24 hr  tablet Take 1 tablet (500 mg total) by mouth 2 (two) times a day with meals. 180 tablet 3     tamsulosin (FLOMAX) 0.4 mg Cp24 Take 2 capsules (0.8 mg total) by mouth daily after supper. 180 capsule 3     triamcinolone (KENALOG) 0.1 % cream Use once daily as needed. 30 g 2     No current facility-administered medications on file prior to encounter.        Allergies   Allergen Reactions     Sulfamethoxazole-Trimethoprim Rash     Inflamed and painful        Past Medical History:   Diagnosis Date     Diabetes mellitus      Hypertension      Hypothyroidism     HYPOTHYROID     Shoulder pain, right 10/4/2015     Stroke         Patient Active Problem List   Diagnosis     Diabetes mellitus     Hypothyroidism     HTN (hypertension)     Stroke     Benign prostatic hyperplasia with lower urinary tract symptoms       Past Surgical History:   Procedure Laterality Date     THYROIDECTOMY         Family History   Problem Relation Age of Onset     No Medical Problems Mother      No Medical Problems Father      Diabetes Sister      No Medical Problems Brother      No Medical Problems Daughter      No Medical Problems Son      No Medical Problems Maternal Aunt      No Medical Problems Maternal Uncle      No Medical Problems Paternal Aunt      No Medical Problems Paternal Uncle      No Medical Problems Maternal Grandmother      No Medical Problems Maternal Grandfather      No Medical Problems Paternal Grandmother      No Medical Problems Paternal Grandfather        SOCIAL HX: Patient denies smoking history, does drink 0.6 ounces of alcohol per week, 1 glass of wine.    ROS: Positive for left arm pain and numbness and tingling.  Specifically negative for bowel/bladder dysfunction, balance changes, headache, dizziness, foot drop, fevers, chills, appetite changes, nausea/vomiting, unexplained weight loss. Otherwise 13 systems reviewed are negative. Please see the patient's intake questionnaire from today for details.    OBJECTIVE:  BP  136/84 (Patient Site: Left Arm, Patient Position: Sitting)  Pulse 86  Temp 98.1  F (36.7  C) (Oral)   Wt 170 lb (77.1 kg)  SpO2 96%  BMI 25.1 kg/m2    PHYSICAL EXAMINATION:  --CONSTITUTIONAL: Vital signs as above. No acute distress. The patient is well nourished and well groomed.  --PSYCHIATRIC: The patient is awake, alert, oriented to person, place, time and answering questions appropriately with clear speech. Appropriate mood and affect   --HEENT: Sclera are non-injected. Extraocular muscles are intact. Thyroid moves easily upon swallowing.  Moist oral mucosa.  --SKIN: Skin over the face, bilateral lower extremities, and posterior torso is clean, dry, intact without rashes.  --LYMPH NODES: No palpable or tender anterior/posterior cervical, submandibular, or supraclavicular lymph nodes.    --RESPIRATORY: Normal rhythm and effort. No abnormal accessory muscle breathing patterns noted.   --GROSS MOTOR: Easily arises from a seated position. Toe walking and heel walking are normal.    --CERVICAL SPINE: Inspection reveals no evidence of deformity. Range of motion is limited in cervical flexion, extension, and lateral rotation. No tenderness to palpation cervical spine or bilateral shoulder.  Spurling maneuver negative on the left but positive on the right eliciting paresthesias second third and fourth fingers.  --SHOULDERS: Full range of motion bilaterally. Negative empty can.  --UPPER EXTREMITY MOTOR TESTING:  Wrist flexion left 5/5, right 5/5  Wrist extension left 5/5, right 5/5  Pronators left 5/5, right 5/5  Biceps left 5/5, right 5/5   Triceps left 5/5, right 5/5   Shoulder abduction left 5/5, right 5/5   left 5/5, right 5/5  --NEUROLOGIC: CN III-XII are grossly intact. 2/4 symmetric biceps, brachioradialis, triceps reflexes bilaterally. Sensation to upper extremities is intact.  Negative Mcintyre's bilaterally.    --VASCULAR: 2/4 radial pulses bilaterally. Warm upper limbs bilaterally. Capillary refill in  the upper extremities is less than 1 second.    RESULTS: Prior medical records from 2/20/2017 to current were reviewed today.     Imaging: MRI of the cervical spine was personally reviewed and interpreted today. The images were shown to the patient and the findings were explained using a spine model.      Right upper extremity EMG 8/2/2017 Dr. Pak  Comment EMG: Normal study  1.  Normal needle EMG right upper extremity.  Interpretation: Normal study  1. There is no electrodiagnostic evidence of cervical radiculopathy, brachial plexopathy, or focal neuropathy in the right upper extremity.     Note: He has hyperreflexia of the right upper extremity.  This is likely related to his history of stroke.  Given his he has worsening pain and symptoms with looking up/movement of his neck, this could represent EMG negative cervical radiculopathy.  Can consider further imaging of the cervical spine such as MRI if indicated clinically.        Mr Limited Cervical Spine Without Contrast  Result Date: 8/17/2017  11:02 AM INDICATION: Neck pain. Abnormal neuro exam. TECHNIQUE: Sagittal T1, T2, STIR and 3-D images acquired. No axial images acquired secondary to significant pain. CONTRAST: None. COMPARISON: Cervical spine radiographic exam 07/20/2017. CT angiogram head and neck 10/05/2015.   FINDINGS: Mild straightening of the cervical spine. Minimal retrolisthesis C4 on C5 without anterolisthesis or significant coronal curvature. Vertebral body heights are maintained. Multilevel discogenic endplate changes particularly at C3-C4, C4-C5 and C5-C6 with posterior projecting disc osteophyte complexes particularly severe on the right at C4-C5 and C5-C6. Effacement of the ventral thecal sac at C3-C4, C4-C5, C5-C6 and C6-C7 with spinal stenosis most severe C4-C5. Craniocervical junction intact. Marked degenerative change at C1-C2. No definitive abnormal cord signal intensity on this limited sagittal only exam. The visualized intracranial  contents are unremarkable. Grossly unremarkable paraspinal soft tissues. Multilevel degenerative disc desiccation and disc height loss particularly from C3 through C7. Segmental ossification of the anterior longitudinal ligament at C2-C3. Facet arthropathy on the right is most severe at C3-C4 and on the left at C2-C3. Severe uncovertebral joint osteoarthritis on the right at C4-C5 and at C5-C6. Likely high-grade neural foraminal narrowing on the right at C3-C4, C4-C5 and C5-C6 with likely  high-grade neural foraminal narrowing on the left at C4-C5 and C5-C6. The patient could return for axial imaging for more detailed exam.   CONCLUSION:   1.  No axial images acquired secondary to pain. The patient could return for axial images for more detailed assessment.   2.  Degenerative disc disease from at least C3-C7 with posterior disc osteophyte complexes from C3-C7 with effacement of the ventral thecal sacs. Spinal stenosis most severe at C4-C5.   3.  Partial demonstration of multilevel uncovertebral and facet arthropathy. Suspect severe neural foraminal narrowing on the right at C3-C4, C4-C5 and C5-C6 and likely high-grade on the left at C4-C5 and C5-C6.       CT CERVICAL SPINE WO CONTRAST  10/18/2014   INDICATION: Syncope/collapse, injury to the neck.  TECHNIQUE: Helical thin-section CT scan of the spine was performed using bone reconstruction algorithm. From the axial source data, sagittal and coronal thin reformats.   IV CONTRAST: None.  COMPARISON: None.  FINDINGS: No prevertebral soft tissue swelling.    C1: No fractures.  C2: No Fractures.  C3: Narrowing of the C3-C4 intervertebral disc space. No fractures.   C4: Narrowing at C3-C4 and C4-C5. Facet degenerative changes. No fractures.  C5: Narrowing at C4-C5 and C5-C6. No fractures.  C6: Degenerative change with narrowing at C6-C7. Facet degenerative changes. No fractures.  C7: Facet degenerative changes. No fractures.  IMPRESSION:   Degenerative change demonstrated  in the cervical spine. No fractures.

## 2021-06-12 NOTE — PROGRESS NOTES
New patient evaluation  --Saw Dr. Turner 8/31/17, referred for injection  --To review MRI lumbar spine 8/17/17   --Injection scheduled today at 9:40 AM with Dr. Chand   --C/O left shoulder pain, radiates down the left upper arm pain to the elbow x 3 years, on and off but worsening x 2 months  --Pain increased by lifting his left arm up per pt  --Rates left arm pain 710  --Tingling in the last 3 fingers on the right side since 2/2017  --EMG with Dr. Pak 8/2/17  --No hx of neck surgery, PT, injection    Medication  --No pain meds

## 2021-06-12 NOTE — PROGRESS NOTES
NEUROSURGERY CONSULTATION NOTE:    Assessment:     Cervical nerve root compression    Plan: I have recommended a consult with the spine center.  I have also ordered an injection.  He does have spinal stenosis at C4-5 that is not severe in my interpretation.  He does have foraminal narrowing at C4-5 on the left.  I will see him back after he works with the spine center.    Jamar Lorenzo   [address] [place] [address]  61 y.o. male is sent to me in consultation   by Main Lowery MD     CC:    Chief Complaint   Patient presents with     Neck Pain       HPI:  Neurosurgery consultation was requested by: Dr. Main Lowery   Pain: Neck pain   Radicular Pain is present: Radiates into left shoulder and left upper arm   Lhermitte sign: no  Motor complaints: Denies weakness  Sensory complaints: Numbness in 2nd 3rd and 4th finger on right hand, states from a stroke   Gait and balance issues: Yes  Bowel or bladder issues: Denies   Duration of SX is: 12/2016  The symptoms are worse with: Numbness in right hand when put on a table and left arm when lifting something   The symptoms are better with: Rest  Injury:Denies  Severity is: Mild - moderate  Patient has tried the following conservative measures: Pt does PT for right hand and does help      PROBLEM LIST:  1.  Cord compression myelopathy  2.  Cervical nerve root compression  3.  History of prior stroke involving the right side.    REVIEW OF SYSTEMS:  A 12 point review of systems has been completed.  He does have some difficulty urinating.  He also has a personal history of arthritis.  The neurological review as above.  Otherwise, the review is negative.      Past Medical History:   Diagnosis Date     Diabetes mellitus      Hypertension      Hypothyroidism     HYPOTHYROID     Shoulder pain, right 10/4/2015     Stroke          Past Surgical History:   Procedure Laterality Date     THYROIDECTOMY           MEDICATIONS:  Current Outpatient Prescriptions    Medication Sig Dispense Refill     amLODIPine (NORVASC) 10 MG tablet Take 1 tablet (10 mg total) by mouth daily. 90 tablet 3     aspirin 81 MG EC tablet Take 1 tablet (81 mg total) by mouth daily. 90 tablet 3     atorvastatin (LIPITOR) 40 MG tablet Take 1 tablet (40 mg total) by mouth daily. 90 tablet 3     levothyroxine (SYNTHROID, LEVOTHROID) 175 MCG tablet Take 1 tablet (175 mcg total) by mouth Daily at 6:00 am. 90 tablet 3     losartan (COZAAR) 100 MG tablet Take 1 tablet (100 mg total) by mouth daily. 90 tablet 3     metFORMIN (GLUCOPHAGE-XR) 500 MG 24 hr tablet Take 1 tablet (500 mg total) by mouth 2 (two) times a day with meals. 180 tablet 3     tamsulosin (FLOMAX) 0.4 mg Cp24 Take 2 capsules (0.8 mg total) by mouth daily after supper. 180 capsule 3     triamcinolone (KENALOG) 0.1 % cream Use once daily as needed. 30 g 2     No current facility-administered medications for this visit.          ALLERGIES/SENSITIVITIES:     Allergies   Allergen Reactions     Sulfamethoxazole-Trimethoprim Rash     Inflamed and painful        PERTINENT SOCIAL HISTORY:   Social History     Social History     Marital status:      Spouse name: N/A     Number of children: N/A     Years of education: N/A     Social History Main Topics     Smoking status: Never Smoker     Smokeless tobacco: Never Used     Alcohol use 0.6 oz/week     1 Glasses of wine per week      Comment: once per week     Drug use: No     Sexual activity: Not Asked     Other Topics Concern     None     Social History Narrative    Lives with Corrine Ash.  Works as a certified nurse assistant.  Six children.  Originally from [place].           FAMILY HISTORY:  Family History   Problem Relation Age of Onset     No Medical Problems Mother      No Medical Problems Father      Diabetes Sister      No Medical Problems Brother      No Medical Problems Daughter      No Medical Problems Son      No Medical Problems Maternal Aunt      No Medical Problems Maternal Uncle   "    No Medical Problems Paternal Aunt      No Medical Problems Paternal Uncle      No Medical Problems Maternal Grandmother      No Medical Problems Maternal Grandfather      No Medical Problems Paternal Grandmother      No Medical Problems Paternal Grandfather         PHYSICAL EXAM:   Constitutional: /81  Pulse 98  Ht 5' 9\" (1.753 m)  Wt 172 lb (78 kg)  SpO2 98%  BMI 25.4 kg/m2    General appearance: Appropriately groomed.  No acute distress.  Interactive.     Mental Status: Mental status: Alert and oriented, mood and affect appropriate, language reception and expression normal, recent and remote memory is normal, higher cortical function normal. Attention span, concentration and ability to follow commands is normal.       Cranial Nerves: Face is symmetric.  Extraocular movements are full, conjugate and without nystagmus.  Hearing is preserved.  Shoulder position is symmetric.  Tongue is midline with normal motion.      Motor: Motor exam nl bilateral UE. Tone nl, bulk nl and strength 5/5 all groups.      Sensory: Sensory exam by subjective report intact to LT,PP,Position and Vib. in the UE and  LE, with the exception of some altered sensation in the right hand that is nonradicular.     Station and Gait: Wide stance with shuffling gait.     Reflexes; hyperreflexia present on the right.  Less hyperreflexia on the left.  Mcintyre's positive on the right.    IMAGING:  I have personally reviewed the images and discussed the findings with Jamar Lorenzo.   He has multilevel degenerative disease of the cervical spine.  He could only tolerate the sagittal images and does not have axial images.  From the sagittal appearance it does show some spinal stenosis at C4-5.  He also has foraminal stenosis at this level.    CC:     Main Lowery MD[address] [address]     "

## 2021-06-13 NOTE — PROGRESS NOTES
"[facility] Daily Progress     Patient Name: Jamar Lorenzo  Date: 10/9/2017  Visit #:  through 17  PTA visit #:  2  PRECAUTIONS: DM, h/o CVA  Referral Diagnosis:   723.4 (ICD-9-CM) - G54.2 (ICD-10-CM) - Cervical nerve root compression   782.0 (ICD-9-CM) - R20.0 (ICD-10-CM) - Numbness of right hand   723.4 (ICD-9-CM) - M54.12 (ICD-10-CM) - Radiculitis of left cervical region      Referring provider: Anca Gill C*  Visit Diagnosis:     ICD-10-CM    1. Right cervical radiculopathy M54.12    2. Decreased ROM of neck R29.898    3. Abnormal posture R29.3    4. Left cervical radiculopathy M54.12          Assessment:     HEP/POC compliance is  good .  Patient is benefitting from skilled physical therapy and is making steady progress toward functional goals.  Patient is appropriate to continue with skilled physical therapy intervention, as indicated by initial plan of care.   *Overall, patient reports 70% improvement in sx and function from start of care.    Goal Status: ALL PROGRESSING  Pt. will be independent with home exercise program in : 4 weeks  Patient will twist/turn : in bed;with no pain;for bed mobilitiy;in 4 weeks  Patient Turn Head: for watching traffic;in 4 weeks;Comment;with less pain  Comment: >= 65* L/R  Patient will look up / down: for reading;for drinking;with less pain;in 4 weeks;Comment  Comment: >= 40* flex/extension  Patient will decrease : NDI score;for improved quality of life;in 4 weeks;by _ points  by ___ points: >= 5    Plan / Patient Education:     Continue current ex, progressing as able  Continue manual and cervical traction per below.  *Pt to complete NDI at home (forgot his glassess) and bring in to 10/12 appt.    Subjective:     Pain Ratin/10 currently.  4-5/10 tingling in the R hand (1st-3rd digits). No numbness in that hand anymore.  \"The only time I notice the L arm pain now is when I try to sleep on that side, but it goes away once I roll " "over.\"    Objective:     Continued forward flexed, kyphotic posture with mild forward head.  Intermittent cueing required for correct performance of ex.    Treatment Today     TREATMENT MINUTES COMMENTS   Evaluation     Self-care/ Home management     Manual therapy  -Supine T lying over thoracic towel roll lengthwise: B pec minor sustained stretch/MFR  (not performed today)   Neuromuscular Re-education     Therapeutic Activity     Therapeutic Exercises 13 Ex per flow sheet   Gait training     Modality__________________ 15 min tx +3setup Supine Cervical traction 26# of pull, intermittent 60 sec pull 10 release              Total 31    Blank areas are intentional and mean the treatment did not include these items.       Kendall Guo  10/9/2017    "

## 2021-06-13 NOTE — PROGRESS NOTES
[facility] Daily Progress     Patient Name: Jamar Lorenzo  Date: 10/2/2017  Visit #: 5/12 through 11/18/17  PTA visit #:  1  PRECAUTIONS: DM, h/o CVA  Referral Diagnosis:   723.4 (ICD-9-CM) - G54.2 (ICD-10-CM) - Cervical nerve root compression   782.0 (ICD-9-CM) - R20.0 (ICD-10-CM) - Numbness of right hand   723.4 (ICD-9-CM) - M54.12 (ICD-10-CM) - Radiculitis of left cervical region      Referring provider: Anca Gill C*  Visit Diagnosis:     ICD-10-CM    1. Left cervical radiculopathy M54.12    2. Right cervical radiculopathy M54.12    3. Decreased ROM of neck R29.898    4. Abnormal posture R29.3          Assessment:     HEP/POC compliance is  good .  Patient is benefitting from skilled physical therapy and is making steady progress toward functional goals.  Patient is appropriate to continue with skilled physical therapy intervention, as indicated by initial plan of care.   Pt demonstrating mild improvements in cervical AROM t/o, as well as decreased pain levels from start of care.    Goal Status: Ongoing  Pt. will be independent with home exercise program in : 4 weeks  Patient will twist/turn : in bed;with no pain;for bed mobilitiy;in 4 weeks  Patient Turn Head: for watching traffic;in 4 weeks;Comment;with less pain  Comment: >= 65* L/R  Patient will look up / down: for reading;for drinking;with less pain;in 4 weeks;Comment  Comment: >= 40* flex/extension  Patient will decrease : NDI score;for improved quality of life;in 4 weeks;by _ points  by ___ points: >= 5    Plan / Patient Education:     Continue current ex, progressing UE postural strength and B UE neurodynamics as tolerated.  Continue manual and cervical traction per below.    Subjective:     Pain Rating: 3/10  Reports L UE pain to be getting less and less. No more numbness in the R UE, only tingling, and that is getting better. Liking the traction.  Denies waking due to pain.    Objective:     Pt arrived 13 min late.  Continued  forward flexed, kyphotic posture.  Pt continues to require fairly significant verbal and tactile cueing for correct performance of HEP; pt would benefit from continued review.  GTB issued for home.  Cervical ROM  Date:      *Indicate scale AROM AROM AROM   Cervical Flexion 57     Cervical Extension 20      Right Left Right Left Right Left   Cervical Sidebending 10 7       Cervical Rotation 55 57       Cervical Protraction      Cervical Retraction      Thoracic Flexion      Thoracic Extension      Thoracic Sidebending         Thoracic Rotation               Treatment Today     TREATMENT MINUTES COMMENTS   Evaluation     Self-care/ Home management     Manual therapy 5 -Supine T lying over thoracic towel roll lengthwise: sustained B pec minor stretch/MFR   Neuromuscular Re-education     Therapeutic Activity     Therapeutic Exercises 15 Ex per flow sheet   Gait training     Modality__________________ 15 min tx +3setup Supine Cervical traction 25# of pull, intermittent 60 sec pull 10 release              Total 38    Blank areas are intentional and mean the treatment did not include these items.       Kendall Guo  10/2/2017

## 2021-06-13 NOTE — PROGRESS NOTES
[facility] Daily Progress     Patient Name: Jamar Lorenzo  Date: 2017  Visit #:  through 17  PTA visit #:  1  PRECAUTIONS: DM, h/o CVA  Referral Diagnosis:   723.4 (ICD-9-CM) - G54.2 (ICD-10-CM) - Cervical nerve root compression   782.0 (ICD-9-CM) - R20.0 (ICD-10-CM) - Numbness of right hand   723.4 (ICD-9-CM) - M54.12 (ICD-10-CM) - Radiculitis of left cervical region      Referring provider: Anca Gill C*  Visit Diagnosis:     ICD-10-CM    1. Left cervical radiculopathy M54.12    2. Right cervical radiculopathy M54.12    3. Decreased ROM of neck R29.898    4. Abnormal posture R29.3          Assessment:   Pt needs verbal and tactile cues for posture/HEP  HEP/POC compliance is  good .  Patient is appropriate to continue with skilled physical therapy intervention, as indicated by initial plan of care.   Pt with good tolerance for all exercises, with no increase in pain.  Pt with good tolerance for traction without increased pain or soreness. No immediate change in R UE sx, although difficult to assess since sx are intermittent.    Goal Status: Ongoing  Pt. will be independent with home exercise program in : 4 weeks  Patient will twist/turn : in bed;with no pain;for bed mobilitiy;in 4 weeks  Patient Turn Head: for watching traffic;in 4 weeks;Comment;with less pain  Comment: >= 65* L/R  Patient will look up / down: for reading;for drinking;with less pain;in 4 weeks;Comment  Comment: >= 40* flex/extension  Patient will decrease : NDI score;for improved quality of life;in 4 weeks;by _ points  by ___ points: >= 5    Plan / Patient Education:     Continue current ex, progressing UE postural strength and B UE neurodynamics as tolerated.  Continue manual per below.  Continue CTx as needed    Subjective:     Pain Ratin/10  Reports less L UE pain compared to last visit. Seems to be less in the shoulder joint and now just down a little bit (into the upper arm) into the muscles. R UE  paresthesias about the same.  Reports CTx seemed to help the last time    Objective:     Continued forward flexed, kyphotic posture.  Pt continues to require fairly significant verbal and tactile cueing for correct performance of HEP; pt would benefit from continued review.  Pt needs verbal tactile cues for posture  Continued CTx   Tolerated treatment well    Treatment Today     TREATMENT MINUTES COMMENTS   Evaluation     Self-care/ Home management     Manual therapy  -Supine T lying over thoracic towel roll lengthwise: sustained B pec minor stretch/MFR   Neuromuscular Re-education     Therapeutic Activity     Therapeutic Exercises 12 Ex per flow sheet   Gait training     Modality__________________ 15 +3setup Supine Sterling cervical traction 22# of pull, intermittent 60 sec pull 10 release              Total 30    Blank areas are intentional and mean the treatment did not include these items.       Inocencia Riley  9/28/2017

## 2021-06-13 NOTE — PROGRESS NOTES
[facility] Daily Progress     Patient Name: Jamar Lorenzo  Date: 10/5/2017  Visit #: 6/12 through 11/18/17  PTA visit #:  2  PRECAUTIONS: DM, h/o CVA  Referral Diagnosis:   723.4 (ICD-9-CM) - G54.2 (ICD-10-CM) - Cervical nerve root compression   782.0 (ICD-9-CM) - R20.0 (ICD-10-CM) - Numbness of right hand   723.4 (ICD-9-CM) - M54.12 (ICD-10-CM) - Radiculitis of left cervical region      Referring provider: Anca Gill C*  Visit Diagnosis:     ICD-10-CM    1. Right cervical radiculopathy M54.12    2. Decreased ROM of neck R29.898    3. Abnormal posture R29.3          Assessment:     HEP/POC compliance is  good .  Patient is benefitting from skilled physical therapy and is making steady progress toward functional goals.  Patient is appropriate to continue with skilled physical therapy intervention, as indicated by initial plan of care.   Pt demonstrating mild improvements in cervical AROM t/o, as well as decreased pain levels from start of care.  Reports 70% improvement, sleeping is normal, decreased numbness    Goal Status: Ongoing  Pt. will be independent with home exercise program in : 4 weeks  Patient will twist/turn : in bed;with no pain;for bed mobilitiy;in 4 weeks  Patient Turn Head: for watching traffic;in 4 weeks;Comment;with less pain  Comment: >= 65* L/R  Patient will look up / down: for reading;for drinking;with less pain;in 4 weeks;Comment  Comment: >= 40* flex/extension  Patient will decrease : NDI score;for improved quality of life;in 4 weeks;by _ points  by ___ points: >= 5    Plan / Patient Education:     Continue current ex, progressing as able  Continue manual and cervical traction per below.    Subjective:     Pain Rating: 3/10 intermittent pain, decreased numbness and tingling  Reports 70% improvement  Traction feels good and seems to be helping  Sleeping is normal    Objective:     Continued forward flexed, kyphotic posture.  Pt continues to require fairly significant  verbal and tactile cueing for correct performance of HEP; pt would benefit from continued review.  Relief with CTx        Treatment Today     TREATMENT MINUTES COMMENTS   Evaluation     Self-care/ Home management     Manual therapy 5 -Supine T lying over thoracic towel roll lengthwise:    Neuromuscular Re-education     Therapeutic Activity     Therapeutic Exercises 10 Ex per flow sheet   Gait training     Modality__________________ 15 min tx +3setup Supine Cervical traction 25# of pull, intermittent 60 sec pull 10 release              Total 33    Blank areas are intentional and mean the treatment did not include these items.       Inocencia Riley  10/5/2017

## 2021-06-13 NOTE — PROGRESS NOTES
[facility] Daily Progress     Patient Name: Jamar Lorenzo  Date: 2017  Visit #: 2  PTA visit #:    PRECAUTIONS: DM, h/o CVA  Referral Diagnosis:   723.4 (ICD-9-CM) - G54.2 (ICD-10-CM) - Cervical nerve root compression   782.0 (ICD-9-CM) - R20.0 (ICD-10-CM) - Numbness of right hand   723.4 (ICD-9-CM) - M54.12 (ICD-10-CM) - Radiculitis of left cervical region      Referring provider: Anca Gill C*  Visit Diagnosis:     ICD-10-CM    1. Left cervical radiculopathy M54.12    2. Right cervical radiculopathy M54.12    3. Decreased ROM of neck R29.898    4. Abnormal posture R29.3          Assessment:     Patient is appropriate to continue with skilled physical therapy intervention, as indicated by initial plan of care.   Pt with good tolerance for all exercises, with no increase in pain.  Reports pain to be unchanged, 4-5/10 post therapy session.    Goal Status:  Pt. will be independent with home exercise program in : 4 weeks  Patient will twist/turn : in bed;with no pain;for bed mobilitiy;in 4 weeks  Patient Turn Head: for watching traffic;in 4 weeks;Comment;with less pain  Comment: >= 65* L/R  Patient will look up / down: for reading;for drinking;with less pain;in 4 weeks;Comment  Comment: >= 40* flex/extension  Patient will decrease : NDI score;for improved quality of life;in 4 weeks;by _ points  by ___ points: >= 5    Plan / Patient Education:     Continue current ex, progressing UE postural strength and B UE neurodynamics as tolerated.  Continue manual per below, adding cervical distraction next session.    Subjective:     Pain Ratin-5/10   Hurts if I press down through the arm.  Overall, pain about the same.  No soreness after last session.    Objective:     Updated ex per flow sheets.  During supine T lying, patient reports R UE paresthesias to start after about 2 min in the position. Reviewed and encouraged alternating elbow flex/extension to relieve neural tension to minimize  sx.  Mild B scapular winging present.  Continued forward flexed, kyphotic posture.    Treatment Today     TREATMENT MINUTES COMMENTS   Evaluation     Self-care/ Home management     Manual therapy 5 -Supine T lying over thoracic towel roll lengthwise: sustained B pec minor stretch/MFR   Neuromuscular Re-education     Therapeutic Activity     Therapeutic Exercises 21 Ex per flow sheet   Gait training     Modality__________________                Total 26    Blank areas are intentional and mean the treatment did not include these items.       Kendall Guo  9/21/2017

## 2021-06-13 NOTE — PROGRESS NOTES
"Jamar Lorenzo is a 64 y.o. male who is being evaluated via a billable telephone visit.      The patient has been notified of following:     \"This telephone visit will be conducted via a call between you and your physician/provider. We have found that certain health care needs can be provided without the need for a physical exam.  This service lets us provide the care you need with a short phone conversation.  If a prescription is necessary we can send it directly to your pharmacy.  If lab work is needed we can place an order for that and you can then stop by our lab to have the test done at a later time.    Telephone visits are billed at different rates depending on your insurance coverage. During this emergency period, for some insurers they may be billed the same as an in-person visit.  Please reach out to your insurance provider with any questions.    If during the course of the call the physician/provider feels a telephone visit is not appropriate, you will not be charged for this service.\"    Patient has given verbal consent to a Telephone visit? Yes    What phone number would you like to be contacted at? [phone]     Patient would like to receive their AVS by AVS Preference: Wilber.    Jamar Lorenzo is a 64 y.o. male who tested positive with Covid-19 on November 23, and negative when retested, having a lingering cough with chest tightness/ pain since.   He has no fever, chills .     Assessment/Plan:  1. Cough  2. Chest tightness  Patient is advised of be seen Face-to-face evaluation, and he is agreeable to the plan of care.     Phone call duration:  9 minutes    Zachariah Sena MD    "

## 2021-06-13 NOTE — PROGRESS NOTES
Office Visit - Follow Up   Jamar Lorenzo   61 y.o. male    Date of Visit: 10/24/2017    Chief Complaint   Patient presents with     Diabetes        Assessment and Plan   1. Need for prophylactic vaccination and inoculation against influenza  - Influenza, Seasonal Quad, Preservative Free 36+ Months    2. Screen for colon cancer    3. Type 2 diabetes mellitus without complication, without long-term current use of insulin  Continue metformin, aspirin, statin, needs diabetic eye exam a small amount of double vision when looking to the right  - Glycosylated Hemoglobin A1c    4. Essential hypertension  Controlled, continue current medication  - Potassium  - Creatinine    5. Postoperative hypothyroidism  Continue levothyroxine  - Thyroid Stimulating Hormone (TSH)    6. Cerebrovascular accident (CVA), unspecified mechanism  See above doing well    Return in about 3 months (around 1/24/2018) for diabetes follow up.     History of Present Illness   This 61 y.o. old man comes in for routine follow-up of diabetes, hypertension, stroke syndrome.  Since I saw him last he seen our neurosurgeons as well as her physical therapist in the spine clinic.  He has had nice reduction in radiculopathy of his right arm and hand although still has some tingling present.  He was going to have an epidural steroid injection but this was canceled due to his concern about permanent disability.  He was put on gabapentin which he believes caused double vision.  He stopped gabapentin and the double vision has almost resolved.  He is due for his annual diabetic eye exam with Dr. Diego Rojas.  He states his blood sugars have otherwise been okay.  Is taking medications as prescribed.  No lightheadedness or dizziness.  No other new neurological deficits.  No chest pain no shortness of breath no nausea vomiting or diarrhea.    Review of Systems: A comprehensive review of systems was negative except as noted.     Medications, Allergies and Problem  "List   Reviewed and updated     Physical Exam   General Appearance:   No acute distress    /78 (Patient Site: Left Arm, Patient Position: Sitting, Cuff Size: Adult Regular)  Pulse 90  Ht 5' 9\" (1.753 m)  Wt 172 lb (78 kg)  SpO2 96%  BMI 25.4 kg/m2    HEENT exam is unremarkable  Neck supple no thyromegaly or nodule palpable  Lymphatic no cervical lymphadenopathy  Cardiovascular regular rate and rhythm no murmur gallop or rub  Pulmonary lungs are clear to auscultation bilaterally  Gastrointestinall abdomen soft nontender nondistended no organomegaly  Neurologic exam is very mild right-sided weakness from his stroke  Psychiatric pleasant, no confusion or agitation        Additional Information   Current Outpatient Prescriptions   Medication Sig Dispense Refill     amLODIPine (NORVASC) 10 MG tablet Take 1 tablet (10 mg total) by mouth daily. 90 tablet 3     aspirin 81 MG EC tablet Take 1 tablet (81 mg total) by mouth daily. 90 tablet 3     atorvastatin (LIPITOR) 40 MG tablet Take 1 tablet (40 mg total) by mouth daily. 90 tablet 3     gabapentin (NEURONTIN) 300 MG capsule Take 1 capsule (300 mg total) by mouth 3 (three) times a day. Follow Gabapentin Dosing chart given 90 capsule 3     levothyroxine (SYNTHROID, LEVOTHROID) 175 MCG tablet Take 1 tablet (175 mcg total) by mouth Daily at 6:00 am. 90 tablet 3     losartan (COZAAR) 100 MG tablet Take 1 tablet (100 mg total) by mouth daily. 90 tablet 3     metFORMIN (GLUCOPHAGE-XR) 500 MG 24 hr tablet Take 1 tablet (500 mg total) by mouth 2 (two) times a day with meals. 180 tablet 3     tamsulosin (FLOMAX) 0.4 mg Cp24 Take 2 capsules (0.8 mg total) by mouth daily after supper. 180 capsule 3     triamcinolone (KENALOG) 0.1 % cream Use once daily as needed. 30 g 2     No current facility-administered medications for this visit.      Allergies   Allergen Reactions     Sulfamethoxazole-Trimethoprim Rash     Inflamed and painful      Social History   Substance Use Topics "     Smoking status: Never Smoker     Smokeless tobacco: Never Used     Alcohol use 0.6 oz/week     1 Glasses of wine per week      Comment: once per week       Review and/or order of clinical lab tests:  Review and/or order of radiology tests:  Review and/or order of medicine tests:  Discussion of test results with performing physician:  Decision to obtain old records and/or obtain history from someone other than the patient:  Review and summarization of old records and/or obtaining history from someone other than the patient and.or discussion of case with another health care provider:  Independent visualization of image, tracing or specimen itself:    Time:      Main Lowery MD

## 2021-06-13 NOTE — PROGRESS NOTES
[facility] Daily Progress     Patient Name: Jamar Lorenzo  Date: 2017  Visit #: 3/12 through 17  PTA visit #:    PRECAUTIONS: DM, h/o CVA  Referral Diagnosis:   723.4 (ICD-9-CM) - G54.2 (ICD-10-CM) - Cervical nerve root compression   782.0 (ICD-9-CM) - R20.0 (ICD-10-CM) - Numbness of right hand   723.4 (ICD-9-CM) - M54.12 (ICD-10-CM) - Radiculitis of left cervical region      Referring provider: Anca Gill C*  Visit Diagnosis:     ICD-10-CM    1. Left cervical radiculopathy M54.12    2. Right cervical radiculopathy M54.12    3. Decreased ROM of neck R29.898    4. Abnormal posture R29.3          Assessment:     HEP/POC compliance is  good .  Patient is appropriate to continue with skilled physical therapy intervention, as indicated by initial plan of care.   Pt with good tolerance for all exercises, with no increase in pain.  Pt with good tolerance for traction without increased pain or soreness. No immediate change in R UE sx, although difficult to assess since sx are intermittent.    Goal Status:  Pt. will be independent with home exercise program in : 4 weeks  Patient will twist/turn : in bed;with no pain;for bed mobilitiy;in 4 weeks  Patient Turn Head: for watching traffic;in 4 weeks;Comment;with less pain  Comment: >= 65* L/R  Patient will look up / down: for reading;for drinking;with less pain;in 4 weeks;Comment  Comment: >= 40* flex/extension  Patient will decrease : NDI score;for improved quality of life;in 4 weeks;by _ points  by ___ points: >= 5    Plan / Patient Education:     Continue current ex, progressing UE postural strength and B UE neurodynamics as tolerated.  Continue manual per below.  Assess cervical traction effectiveness.    Subjective:     Pain Ratin/10  Reports less L UE pain compared to last visit. Seems to be less in the shoulder joint and now just down a little bit (into the upper arm) into the muscles. R UE paresthesias about the same.    Objective:      Continued forward flexed, kyphotic posture.  Pt continues to require fairly significant verbal and tactile cueing for correct performance of HEP; pt would benefit from continued review.  Pt education provided on benefits, mechanism, use, and precautions with traction. Pt verbalizes understanding and agreement to use.    Treatment Today     TREATMENT MINUTES COMMENTS   Evaluation     Self-care/ Home management     Manual therapy 8 -Supine T lying over thoracic towel roll lengthwise: sustained B pec minor stretch/MFR   Neuromuscular Re-education     Therapeutic Activity     Therapeutic Exercises 24 Ex per flow sheet   Gait training     Modality__________________ 10 Supine [place] cervical traction 22# of pull, static              Total 42    Blank areas are intentional and mean the treatment did not include these items.       Kendall Guo  9/25/2017

## 2021-06-13 NOTE — PROGRESS NOTES
[facility] Daily Progress     Patient Name: Jamar Lorenzo  Date: 10/23/2017  Visit #:  through 17  PTA visit #:  2  PRECAUTIONS: DM, h/o CVA  Referral Diagnosis:   723.4 (ICD-9-CM) - G54.2 (ICD-10-CM) - Cervical nerve root compression   782.0 (ICD-9-CM) - R20.0 (ICD-10-CM) - Numbness of right hand   723.4 (ICD-9-CM) - M54.12 (ICD-10-CM) - Radiculitis of left cervical region      Referring provider: Anca Gill C*  Visit Diagnosis:     ICD-10-CM    1. Right cervical radiculopathy M54.12    2. Decreased ROM of neck R29.898    3. Abnormal posture R29.3    4. Left cervical radiculopathy M54.12          Assessment:     HEP/POC compliance is  good .  Patient is benefitting from skilled physical therapy and is making steady progress toward functional goals.  Patient is appropriate to continue with skilled physical therapy intervention, as indicated by initial plan of care.   *Overall, patient is demonstrating significant improvement in sx and function from start of care, demonstrating improved, pain-free cervical AROM and centralizing UE pain from start of care.    Goal Status: ALL PROGRESSING  Pt. will be independent with home exercise program in : 4 weeks  Patient will twist/turn : in bed;with no pain;for bed mobilitiy;in 4 weeks  Patient Turn Head: for watching traffic;in 4 weeks;Comment;with less pain  Comment: >= 65* L/R  Patient will look up / down: for reading;for drinking;with less pain;in 4 weeks;Comment  Comment: >= 40* flex/extension  Patient will decrease : NDI score;for improved quality of life;in 4 weeks;by _ points  by ___ points: >= 5    Plan / Patient Education:     Review HEP as needed.  Continue manual therapy and cervical traction per below.    Subjective:     Pain Ratin/10 currently.  Still no numbness, but tingling in the R hand (1st-3rd digits) is about the same. Reports that it will happen about 10-15x/day, lasting seconds to 1-2 minutes at most, and then will go  away.  No pain in the L UE.    Objective:     Continued mild forward flexed, kyphotic posture with mild forward head.  NDI: 9%    Treatment Today     TREATMENT MINUTES COMMENTS   Evaluation     Self-care/ Home management     Manual therapy 9 -Supine T lying over thoracic towel roll lengthwise: B pec minor sustained stretch/MFR  *Pt performing intermittent R wrist/finger extension (median nerve glides) during   Neuromuscular Re-education     Therapeutic Activity     Therapeutic Exercises 4 UBE 2 min F, 2 min B   Gait training     Modality__________________ 15 min tx +3setup Supine Cervical traction 27# of pull, intermittent 60 sec pull 10 release              Total 31    Blank areas are intentional and mean the treatment did not include these items.       Kendall Guo  10/23/2017

## 2021-06-13 NOTE — PROGRESS NOTES
Assessment/Plan:        1. Cough  Exam findings were discussed   Plan:  - Amb referral to Adult Speech Therapy- Internal    2. Type 2 diabetes mellitus with hyperglycemia, with long-term current use of insulin (H)     Plan:   Hold insulin and monitor blood glucose, goal to Keep below 130.  continue on metfirmin     Follow up in a month       3. Benign prostatic hyperplasia with urinary frequency  Refill   - tamsulosin (FLOMAX) 0.4 mg cap; Take 2 capsules (0.8 mg total) by mouth daily after supper.  Dispense: 180 capsule; Refill: 1        At the conclusion of the encounter the plan of care, disposition and all questions were answered and reviewed, and the patient acknowledged understanding and was involved in the decision making regarding the overall care plan.     Patient Care Team:  Main Lowery MD as PCP - General (Internal Medicine)  Main Lowery MD as Assigned PCP          Subjective:    Patient ID:   Jamar Lorenzo is a 64 y.o. male who is complaining of Coughing after eating or talking about a week before being diagnosed with Covid-19 on 11/23/20.  He denies shortness of breath or having chest pains     Diabetes:   Taking lantus 10 in am, metfomin 1000 two times a day   He is questioning whether or not to stop the insulin       Review of Systems  Allergy: reviewed  General : negative  A complete 5 point review of systems was obtained and is negative other than what is stated in the HPI.      The following patient's history were reviewed and updated as appropriate:   He  has a past medical history of Diabetes mellitus (H), Hypertension, Hypothyroidism, Shoulder pain, right (10/4/2015), and Stroke (H)..      Outpatient Encounter Medications as of 12/11/2020   Medication Sig Dispense Refill     acetaminophen (TYLENOL) 650 MG CR tablet Take 2 tablets (1,300 mg total) by mouth every 8 (eight) hours as needed for pain. (Patient taking differently: Take 1,300 mg by mouth every 8 (eight) hours as  "needed for pain (Patient takes 650 mg every 8 hours as needed) .      ) 180 tablet 11     amLODIPine (NORVASC) 10 MG tablet Take 1 tablet (10 mg total) by mouth at bedtime. 90 tablet 3     aspirin 81 MG EC tablet Take 1 tablet (81 mg total) by mouth daily. 90 tablet 1     atorvastatin (LIPITOR) 40 MG tablet TAKE ONE TABLET BY MOUTH ONE TIME DAILY  30 tablet 11     FISH -1,000 mg cap Take 1 capsule (1 g total) by mouth daily. 30 capsule 1     fluticasone (FLONASE) 50 mcg/actuation nasal spray 1-2 sprays into each nostril daily. 16 g 11     levothyroxine (SYNTHROID, LEVOTHROID) 150 MCG tablet Take 1 tablet (150 mcg total) by mouth Daily at 6:00 am. 90 tablet 3     losartan (COZAAR) 100 MG tablet Take 1 tablet (100 mg total) by mouth at bedtime. 90 tablet 3     metFORMIN (GLUCOPHAGE-XR) 750 MG 24 hr tablet Take 1 tablet (750 mg total) by mouth daily with breakfast. 90 tablet 3     metoprolol succinate (TOPROL-XL) 50 MG 24 hr tablet Take 1 tablet (50 mg total) by mouth daily. 90 tablet 3     multivitamin therapeutic tablet Take 1 tablet by mouth daily.       polyvinyl alcohol (ARTIFICIAL TEARS, POLYVIN ALC,) 1.4 % ophthalmic solution Apply one drop into each eye four times daily as needed for dry eyes. 15 mL 10     tamsulosin (FLOMAX) 0.4 mg cap Take 1 capsule (0.4 mg total) by mouth daily after supper. 90 capsule 1     triamcinolone (KENALOG) 0.1 % cream Use once daily as needed.. 30 g 2     No facility-administered encounter medications on file as of 12/11/2020.          Objective:   /64 (Patient Site: Left Arm, Patient Position: Sitting, Cuff Size: Adult Regular)   Pulse 91   Temp (!) 96.4  F (35.8  C) (Tympanic)   Resp 20   Ht 5' 9\" (1.753 m)   Wt 176 lb 9.6 oz (80.1 kg)   SpO2 98%   BMI 26.08 kg/m        Physical Exam  General Appearance:    Alert, well hydrated, no distress   Throat:   mucous membranes moist, pharynx normal without lesions   Neck:   Supple, symmetrical, trachea midline, no " adenopathy;     thyroid:  no enlargement/tenderness/nodules;    Lungs:     clear to auscultation, no wheezes, rales or rhonchi, symmetric air entry     Heart:    Regular rate and rhythm, S1 and S2 normal, no murmur, rub   or gallop, no edema    Skin:   Skin color, texture, turgor normal, no rashes or lesions

## 2021-06-13 NOTE — TELEPHONE ENCOUNTER
Patient calling, he is reporting he has   Had a cough for 1 week, and he reports he gets covid tests at work every other day, and they are always negative.    He was asking for an IN PERSON appointment, and he was advised to make a telephone appointment.  He reports he thinks he may have   Pneumonia .    He was transferred to PSR to schedule telephone appointment.    Anju Mcmahon RN  [facility] Triage/refill nurse    Reason for Disposition    Cough    Protocols used: COUGH - ACUTE NON-PRODUCTIVE-A-AH

## 2021-06-13 NOTE — PROGRESS NOTES
[facility] Daily Progress     Patient Name: Jamar Lorenzo  Date: 10/26/2017  Visit #: 10/12 through 17  PTA visit #:  2  PRECAUTIONS: DM, h/o CVA  Referral Diagnosis:   723.4 (ICD-9-CM) - G54.2 (ICD-10-CM) - Cervical nerve root compression   782.0 (ICD-9-CM) - R20.0 (ICD-10-CM) - Numbness of right hand   723.4 (ICD-9-CM) - M54.12 (ICD-10-CM) - Radiculitis of left cervical region      Referring provider: Anca Gill C*  Visit Diagnosis:     ICD-10-CM    1. Right cervical radiculopathy M54.12    2. Decreased ROM of neck R29.898    3. Abnormal posture R29.3    4. Left cervical radiculopathy M54.12          Assessment:     HEP/POC compliance is  good .  Patient is benefitting from skilled physical therapy and is making steady progress toward functional goals.  Patient is appropriate to continue with skilled physical therapy intervention, as indicated by initial plan of care.   *Overall, patient is demonstrating significant improvement in sx and function from start of care, demonstrating improved, pain-free cervical AROM and centralizing UE pain from start of care.    Goal Status: ALL PROGRESSING  Pt. will be independent with home exercise program in : 4 weeks  Patient will twist/turn : in bed;with no pain;for bed mobilitiy;in 4 weeks  Patient Turn Head: for watching traffic;in 4 weeks;Comment;with less pain  Comment: >= 65* L/R  Patient will look up / down: for reading;for drinking;with less pain;in 4 weeks;Comment  Comment: >= 40* flex/extension  Patient will decrease : NDI score;for improved quality of life;in 4 weeks;by _ points  by ___ points: >= 5    Plan / Patient Education:     Review HEP as needed.  Continue manual therapy and cervical traction per below.    Subjective:     Pain Ratin/10 currently.  About the same from last visit: Still no numbness, but tingling in the R hand (1st-3rd digits) is about the same. Reports that it will happen about 10-15x/day, lasting seconds to 1-2  minutes at most, and then will go away.  No pain in the L UE.    Objective:     Continued mild forward flexed, kyphotic posture with mild forward head.  Pt reports mild R hand paresthesias after approx. 3 min of sustained pec minor stretching.    Treatment Today     TREATMENT MINUTES COMMENTS   Evaluation     Self-care/ Home management     Manual therapy 6 -Supine T lying over thoracic towel roll lengthwise: B pec minor sustained stretch/MFR  *Pt performing intermittent R wrist/finger extension (median nerve glides) during   Neuromuscular Re-education 2 Supine R UE median nerve gliding with therapist A, pt head in neutral   Therapeutic Activity     Therapeutic Exercises 4 UBE 2 min F, 2 min B   Gait training     Modality__________________ 15 min tx +3setup Supine Cervical traction 27# of pull, intermittent 60 sec pull 10 release              Total 30    Blank areas are intentional and mean the treatment did not include these items.       Kendall Guo  10/26/2017    [facility] Discharge Summary  Patient Name: Jamar Lorenzo  Date: 11/27/2017  Referral Diagnosis:   723.4 (ICD-9-CM) - G54.2 (ICD-10-CM) - Cervical nerve root compression   782.0 (ICD-9-CM) - R20.0 (ICD-10-CM) - Numbness of right hand   723.4 (ICD-9-CM) - M54.12 (ICD-10-CM) - Radiculitis of left cervical region      Referring provider: Anca Gill C*  Visit Diagnosis:   1. Right cervical radiculopathy     2. Decreased ROM of neck     3. Abnormal posture     4. Left cervical radiculopathy         Goals: ALL PROGRESSING  Pt. will be independent with home exercise program in : 4 weeks  Patient will twist/turn : in bed;with no pain;for bed mobilitiy;in 4 weeks  Patient Turn Head: for watching traffic;in 4 weeks;Comment;with less pain  Comment: >= 65* L/R  Patient will look up / down: for reading;for drinking;with less pain;in 4 weeks;Comment  Comment: >= 40* flex/extension  Patient will decrease : NDI score;for improved quality of  life;in 4 weeks;by _ points  by ___ points: >= 5    Patient was seen for 10 visits from 09/19/17 to 10/26/17 with 4 no-show and 3 cancelled missed appointments.  The patient met goals and has demonstrated understanding of and independence in the home program for self-care, and progression to next steps.  He will initiate contact if questions or concerns arise.  Patient received a home program .  The patient discontinued therapy, did not return.    Therapy will be discontinued at this time.  The patient will need a new referral to resume.    Thank you for your referral.  Kendall Guo  11/27/2017  11:38 AM

## 2021-06-13 NOTE — PROGRESS NOTES
Jamar Lorenzo is here today for a Left C7T1 ILESI.  The patient decided to not have the injection, and would like to discuss other options, such as physical therapy and such, at a future appointment.  The patient will be rescheduled with Anca Gill when he is ready to come back and do this.      No charge for today s visit.

## 2021-06-13 NOTE — PROGRESS NOTES
[facility] Daily Progress     Patient Name: Jamar Lorenzo  Date: 10/16/2017  Visit #:  through 17  PTA visit #:  2  PRECAUTIONS: DM, h/o CVA  Referral Diagnosis:   723.4 (ICD-9-CM) - G54.2 (ICD-10-CM) - Cervical nerve root compression   782.0 (ICD-9-CM) - R20.0 (ICD-10-CM) - Numbness of right hand   723.4 (ICD-9-CM) - M54.12 (ICD-10-CM) - Radiculitis of left cervical region      Referring provider: Anca Gill C*  Visit Diagnosis:     ICD-10-CM    1. Right cervical radiculopathy M54.12    2. Decreased ROM of neck R29.898    3. Abnormal posture R29.3    4. Left cervical radiculopathy M54.12          Assessment:     HEP/POC compliance is  good .  Patient is benefitting from skilled physical therapy and is making steady progress toward functional goals.  Patient is appropriate to continue with skilled physical therapy intervention, as indicated by initial plan of care.   *Overall, patient reports 70% improvement in sx and function from start of care, demonstrating improved, pain-free cervical AROM and centralizing UE pain from start of care.    Goal Status: ALL PROGRESSING  Pt. will be independent with home exercise program in : 4 weeks  Patient will twist/turn : in bed;with no pain;for bed mobilitiy;in 4 weeks  Patient Turn Head: for watching traffic;in 4 weeks;Comment;with less pain  Comment: >= 65* L/R  Patient will look up / down: for reading;for drinking;with less pain;in 4 weeks;Comment  Comment: >= 40* flex/extension  Patient will decrease : NDI score;for improved quality of life;in 4 weeks;by _ points  by ___ points: >= 5    Plan / Patient Education:     Review HEP as needed.  Continue manual therapy and cervical traction per below.  Pt to bring in completed NDI at next appt, 10/19.    Subjective:     Pain Ratin/10 currently.  5/10 intermittent tingling in the R hand (1st-3rd digits). Still no numbness anymore.  No pain in the L UE for almost a week now.  Forgot to complete  and bring in NDI; busy as daughter was in the hospital last week.    Objective:     Continued mild forward flexed, kyphotic posture with mild forward head.  Minimal cueing required for correct performance of HEP.  Cervical ROM. Pt denies pain or reproduction of R UE sx with all motions.  Date:      *Indicate scale AROM AROM AROM   Cervical Flexion 55     Cervical Extension 20      Right Left Right Left Right Left   Cervical Sidebending 15 15       Cervical Rotation 50 50       Cervical Protraction      Cervical Retraction      Thoracic Flexion      Thoracic Extension      Thoracic Sidebending         Thoracic Rotation             Treatment Today     TREATMENT MINUTES COMMENTS   Evaluation     Self-care/ Home management     Manual therapy  -Supine T lying over thoracic towel roll lengthwise: B pec minor sustained stretch/MFR  (not performed today)   Neuromuscular Re-education     Therapeutic Activity     Therapeutic Exercises 12 Ex per flow sheet   Gait training     Modality__________________ 15 min tx +3setup Supine Cervical traction 27# of pull, intermittent 60 sec pull 10 release              Total 30    Blank areas are intentional and mean the treatment did not include these items.       Kendall Guo  10/16/2017

## 2021-06-15 NOTE — PROGRESS NOTES
"  Office Visit - Follow Up   Jamar Lorenzo   62 y.o. male    Date of Visit: 1/25/2018    Chief Complaint   Patient presents with     Diabetes     Hypertension        Assessment and Plan   1. Type 2 diabetes mellitus without complication, without long-term current use of insulin  He would like to just take 1 Metformin pill and so we switched it to 750 mg tablet.  Continue aspirin statin losartan, annual diabetic eye exam and excellent diabetic foot care  - Glycosylated Hemoglobin A1c  - Lipid Cascade    2. Essential hypertension  Pressure controlled continue losartan  - Potassium  - Creatinine    3. Cerebrovascular accident (CVA), unspecified mechanism  Continue secondary prevention aspirin statin    4. Postoperative hypothyroidism  Continue levothyroxine  - Thyroid Stimulating Hormone (TSH)    5. Benign prostatic hyperplasia with urinary frequency  Continue Flomax    Return in about 3 months (around 4/25/2018) for diabetes follow up.     History of Present Illness   This 62 y.o. old man comes in for follow-up of diabetes, hypertension, hyperlipidemia, hypothyroidism and previous stroke.  Overall he is doing quite well.  He saw his ophthalmologist, Dr. Mahajan.  Everything looked okay.  Not really having any more double vision and occasional tearing on the right side.  Hand function on the right stable.  No chest pain no shortness of breath no nausea vomiting diarrhea.  No foot concerns.    Review of Systems: A comprehensive review of systems was negative except as noted.     Medications, Allergies and Problem List   Reviewed and updated     Physical Exam   General Appearance:   No acute distress    /72 (Patient Site: Right Arm, Patient Position: Sitting, Cuff Size: Adult Regular)  Pulse 77  Ht 5' 9\" (1.753 m)  Wt 174 lb (78.9 kg)  SpO2 97%  BMI 25.7 kg/m2    HEENT exam is unremarkable  Neck supple no thyromegaly or nodule palpable  Lymphatic no cervical lymphadenopathy  Cardiovascular regular rate and " rhythm no murmur gallop or rub  Pulmonary lungs are clear to auscultation bilaterally  Gastrointestinall abdomen soft nontender nondistended no organomegaly  Neurologic exam is stable, slight weakness with extension of his right hand  Psychiatric pleasant, no confusion or agitation        Additional Information   Current Outpatient Prescriptions   Medication Sig Dispense Refill     amLODIPine (NORVASC) 10 MG tablet Take 1 tablet (10 mg total) by mouth daily. 90 tablet 3     aspirin 81 MG EC tablet Take 1 tablet (81 mg total) by mouth daily. 90 tablet 3     atorvastatin (LIPITOR) 40 MG tablet Take 1 tablet (40 mg total) by mouth daily. 90 tablet 3     levothyroxine (SYNTHROID, LEVOTHROID) 150 MCG tablet Take 1 tablet (150 mcg total) by mouth Daily at 6:00 am. 90 tablet 3     losartan (COZAAR) 100 MG tablet Take 1 tablet (100 mg total) by mouth daily. 90 tablet 3     metFORMIN (GLUCOPHAGE-XR) 750 MG 24 hr tablet Take 1 tablet (750 mg total) by mouth daily with breakfast. 90 tablet 3     tamsulosin (FLOMAX) 0.4 mg Cp24 Take 2 capsules (0.8 mg total) by mouth daily after supper. 180 capsule 3     triamcinolone (KENALOG) 0.1 % cream Use once daily as needed. 30 g 2     No current facility-administered medications for this visit.      Allergies   Allergen Reactions     Sulfamethoxazole-Trimethoprim Rash     Inflamed and painful      Social History   Substance Use Topics     Smoking status: Never Smoker     Smokeless tobacco: Never Used     Alcohol use 0.6 oz/week     1 Glasses of wine per week      Comment: once per week       Review and/or order of clinical lab tests:  Review and/or order of radiology tests:  Review and/or order of medicine tests:  Discussion of test results with performing physician:  Decision to obtain old records and/or obtain history from someone other than the patient:  Review and summarization of old records and/or obtaining history from someone other than the patient and.or discussion of case with  another health care provider:  Independent visualization of image, tracing or specimen itself:    Time:      Main Lowery MD

## 2021-06-16 PROBLEM — E78.5 HYPERLIPIDEMIA: Status: ACTIVE | Noted: 2020-12-10

## 2021-06-16 PROBLEM — E11.9 DIABETES MELLITUS (H): Status: ACTIVE | Noted: 2020-12-10

## 2021-06-16 PROBLEM — I63.81 ACUTE LACUNAR STROKE (H): Status: ACTIVE | Noted: 2020-12-10

## 2021-06-17 NOTE — PATIENT INSTRUCTIONS - HE
Patient Instructions by Main Lowery MD at 3/21/2019  8:40 AM     Author: Main Lowery MD Service: -- Author Type: Physician    Filed: 3/21/2019  4:47 PM Encounter Date: 3/21/2019 Status: Addendum    : Main Lowery MD (Physician)    Related Notes: Original Note by Main Lowery MD (Physician) filed at 3/21/2019  4:44 PM         Patient Education     Your Health Risk Assessment indicates you feel you are not in good physical health.    A healthy lifestyle helps keep the body fit and the mind alert. It helps protect you from disease, helps you fight disease, and helps prevent chronic disease (disease that doesn't go away) from getting worse. This is important as you get older and begin to notice twinges in muscles and joints and a decline in the strength and stamina you once took for granted. A healthy lifestyle includes good healthcare, good nutrition, weight control, recreation, and regular exercise. Avoid harmful substances and do what you can to keep safe. Another part of a healthy lifestyle is stay mentally active and socially involved.    Good healthcare     Have a wellness visit every year.     If you have new symptoms, let us know right away. Don't wait until the next checkup.     Take medicines exactly as prescribed and keep your medicines in a safe place. Tell us if your medicine causes problems.   Healthy diet and weight control     Eat 3 or 4 small, nutritious, low-fat, high-fiber meals a day. Include a variety of fruits, vegetables, and whole-grain foods.     Make sure you get enough calcium in your diet. Calcium, vitamin D, and exercise help prevent osteoporosis (bone thinning).     If you live alone, try eating with others when you can. That way you get a good meal and have company while you eat it.     Try to keep a healthy weight. If you eat more calories than your body uses for energy, it will be stored as fat and you will gain weight.     Recreation    Recreation is not limited to sports and team events. It includes any activity that provides relaxation, interest, enjoyment, and exercise. Recreation provides an outlet for physical, mental, and social energy. It can give a sense of worth and achievement. It can help you stay healthy.       Patient Education    Home Fire Safety  Each year, thousands of people, including children, are injured and killed in home fires. Children are often curious about fire, and may not understand the dangers. This makes home fire safety practices especially important. Three important things you can do to keep your home safe from fire are:    Install smoke alarms in your home and make sure they work properly.    Teach children not to play with matches, lighters, and other materials that can be used to start fires. And keep these materials out of childrens reach.    Teach children what to do in case of fire. Create a fire safety action plan and practice it.  Read on for more details about keeping your family and home safe from fire.        Being Prepared for a Fire  A home fire can happen at any time. The following can help you be prepared:    Install smoke alarms on every level of your home, including the basement and outside all sleeping areas. This simple step cuts your familys risk of dying in a fire nearly in half.    Test smoke alarms monthly, and change the batteries once a year or when the alarm chirps.    Dont disable smoke alarms, even for a short time.    Ask your local fire department for tips on where to place smoke alarms in your home.    Replace all smoke alarms every 10 years.    Consider using voice smoke alarms. These alarms allow you to substitute your own voice for the alarm sound. They are helpful because many children dont wake up to the sound of a regular smoke alarm.    Install carbon monoxide detectors near sleeping areas.    Be aware that carbon monoxide is a byproduct of smoke that can be deadly. Its a gas  that you cant see, smell, or taste.    Consider buying a combination smoke alarm / carbon monoxide detector.    Keep fire extinguishers in the home.    Keep them in accessible locations, especially in the kitchen.    Check usage dates to make sure they are not .    Use fire extinguishers only when the fire is in a contained area and is not spreading. (Otherwise, you should focus on getting out of the home.)    Train adults to use fire extinguishers. (Children should focus on getting out of the home during a fire.)    If you live in an apartment, talk to your landlord about where smoke alarms are and how often they are tested. Also ask about fire extinguisher locations and emergency exit routes.  Indoor Fire Safety  Many things in your home are potential fire hazards. Follow these steps to help keep your home safe.    Be careful in the kitchen.    Never leave food thats cooking unattended.    If a fire breaks out in a cooking pan, put a lid on it to smother it. And never throw water on a grease fire. It will make the fire worse.    Conduct a home safety inspection. Look for anything, such as frayed wires and cords, that can cause a fire. Fix or remove any fire safety hazards you find.    Keep all matches and lighters in a secured drawer or cabinet out of the reach of children. Use childproof lighters.    Check to make sure all appliances, including the stove, are turned off before leaving the home.    Know where the gas main shut-off is located.    Make sure space heaters are stable and have protective covers. Keep them at least 3 feet from anything that can burn, such as curtains. Dont use space heaters in areas where young kids spend time alone.    Keep flammable liquids such as kerosene and gasoline locked up and safely stored away from kids and heat.    Keep all smoking materials out of reach of children. And never smoke in bed. If possible, smoke outdoors only.  Outdoor Fire Safety  Fire can be a hazard  outdoors as well as indoors. When outdoors, be sure to do the following:    Always supervise kids near a barbecue grill, campfire, or portable stove.    Dont use fire pits around children. Kids can fall into them, and pits can be hot even after the fire goes out.    Keep a garden hose or fire extinguisher handy when cooking outdoors in case of fire.  Teaching Your Child About Fire Safety  One of the best ways to keep your home safe from fire is education. Make sure everyone in your family knows fire safety rules, including children.    Teach your children the dangers of matches, lighters, and other dangerous items.    Teach them to never touch these and other objects that are hot, such as candles.    Have them tell you right away whenever they find matches or lighters. Explain that these items are tools for grown-ups, not toys. And never amuse children with matches or lighters.    Round up all matches and lighters and store them safely. In case you missed some, ask your children to tell you where any are located throughout your home.    Never leave a child alone in a room with a lit candle. Dont allow teens to have candles in their rooms.    Show children what to do in case of fire.    Be sure your kids know what the fire alarm sounds like and what to do if it goes off.    Teach kids what to do if their clothes catch fire: Stop, Drop to the ground, and Roll until the fire is put out. They should also cover their face with their hands. Practice these steps with your children. Make sure they understand that running will make the fire burn faster.    Show children how to crawl below smoke during a fire.    Make sure kids know at least two escape routes from each room in the home. These escape routes can be windows.    Teach kids to test doors for nearby fire by feeling for heat with the back of their hand. If the door is warm or hot, they should try their second exit.    Explain to children that they cant hide from a  fire. Hiding in a closet or under a bed wont make them safe. Instead, they should try to escape the home. And if they cant escape, they should let others know they are trapped. They can do this by shutting the door to the room, opening a window, and turning on the lights.    Talk to your local fire department.    Introduce your children to a . Let them know that firefighters will look different when in full protective gear. Tell them to never hide from firefighters, and to follow all directions from firefighters during a fire.    Find out if the fire department has a fire safety program for kids.      Create a Fire Safety Plan  Create a plan for your family to follow in case of a fire. Try making it a family project. Important steps for the plan include leaving the home right away and having a designated meeting place.    Make sure your child understands to get out and stay out. He or she should get out of the home immediately and not go back in, even if family members or pets are still inside.    Decide on a safe meeting place away from the home for everyone to gather.    Teach children to call 911 or emergency services from a cell phone or neighbors phone. Make sure they know to do this only after they are safely out of the home.    Teach your children the fire safety plan. Practice it and make sure they understand it.    Have fire drills twice a year to keep your children prepared in case of fire.    Visit the National Fire Protection Association web site at www.nfpa.org for more information.      0516-7220 [employer]. [address] [place] [address], [place] [address]. All rights reserved. This information is not intended as a substitute for professional medical care. Always follow your healthcare professional's instructions.         Patient Education   Urinary Incontinence (Male)    Urinary incontinence means not being able to control the release of urine from the bladder.  Causes  Common  causes of urinary incontinence in men include:    Infection    Certain medicines    Aging    Poor pelvic muscle tone    Bladder spasms    Obesity    Urinary retention  Nervous system diseases, diabetes, sleep apnea, urinary tract infections, prostate surgery, and pelvic trauma can also cause incontinence. Constipation and smoking have also been identified as risk factors.  Symptoms    Urge incontinence (also called overactive bladder) is a sudden urge to urinate even though there may not be much urine in the bladder. The need to urinate often during the night is common. It is due to bladder spasms.    Stress incontinence is involuntary urine leakage that can occur with sneezing, coughing, and other actions that put stress on the bladder.  Treatment  Treatment of urinary incontinence depends on the cause. Infections of the bladder are treated with antibiotics. Urinary retention is treated with a bladder catheter.  Home care  Follow these guidelines when caring for yourself at home:    Don't consume foods and drinks that may irritate the bladder. These include drinks containing alcohol, caffeinate, or carbonation; chocolate; and acidic fruits and juices.    Limit fluid intake to 6 to 8 cups a day.    Lose weight if you are overweight. This will reduce your symptoms.    If needed, wear absorbent pads to catch urine. Change pads frequently to maintain hygiene and prevent skin and bladder infections.    Bathe daily to maintain good hygiene.    If an antibiotic was prescribed to treat a bladder infection, be sure to take it until finished, even if you are feeling better before then. This is to make sure your infection has cleared.    If a catheter was left in place, it is important to keep bacteria from getting into the collection bag. Don't disconnect the catheter from the collection bag.    Use a leg band to secure the catheter drainage tube, so it does not pull on the catheter. Drain the collection bag when it becomes  full using the drain spout at the bottom of the bag. Don't disconnect the bag from the catheter.    Don't pull on or try to remove a catheter. The catheter must be removed by a healthcare provider.  Follow-up care  Follow up with your healthcare provider, or as advised.  When to seek medical advice  Call your healthcare provider right away if any of these occur:    Fever over 100.4 F (38 C), or as directed by your healthcare provider    Bladder pain or fullness    Abdominal swelling, nausea, or vomiting    Back pain    Weakness, dizziness, or fainting    If a catheter was left in place, return if:  ? Catheter falls out  ? Catheter stops draining for 6 hours  Date Last Reviewed: 10/1/2017    9694-0394 [employer]. [address] [place] [address], [place] [id number]. All rights reserved. This information is not intended as a substitute for professional medical care. Always follow your healthcare professional's instructions.     Patient Education   Preventing Falls in the Home  As you get older, falls are more likely. Thats because your reaction time slows. Your muscles and joints may also get stiffer, making them less flexible. Illness, medications, and vision changes can also affect your balance. A fall could leave you unable to live on your own. To make your home safer, follow these tips:    Floors    Put nonskid pads under area rugs.    Remove throw rugs.    Replace worn floor coverings.    Tack carpets firmly to each step on carpeted stairs. Put nonskid strips on the edges of uncarpeted stairs.    Keep floors and stairs free of clutter and cords.    Arrange furniture so there are clear pathways.    Clean up any spills right away.    Bathrooms    Install grab bars in the tub or shower.    Apply nonskid strips or put a nonskid rubber mat in the tub or shower.    Sit on a bath chair to bathe.    Use bathmats with nonskid backing.    Lighting    Keep a flashlight in each room.    Put a nightlight along the pathway  between the bedroom and the bathroom.    6766-5551 [employer]. [address] [place] [address], [place] [address]. All rights reserved. This information is not intended as a substitute for professional medical care. Always follow your healthcare professional's instructions.         Patient Education   Understanding Advance Care Planning  Advance care planning is the process of deciding ones own future medical care. It helps ensure that if you cant speak for yourself, your wishes can still be carried out. The plan is a series of legal documents that note a persons wishes. The documents vary by state. Advance care planning may be done when a person has a serious illness that is expected to get worse. It may be done before major surgery. And it can help you and your family be prepared in case of a major illness or injury. Advance care planning helps with making decisions at these times.       A health care proxy is a person who acts as the voice of a patient when the patient cant speak for himself or herself. The name of this role varies by state. It may be called a Durable Medical Power of  or Durable Power of  for Healthcare. It may be called an agent, surrogate, or advocate. Or it may be called a representative or decision maker. It is an official duty that is identified by a legal document. The document also varies by state.    Why Is Advance Care Planning Important?  If a person communicates their healthcare wishes:    They will be given medical care that matches their values and goals.    Their family members will not be forced to make decisions in a crisis with no guidance.  Creating a Plan  Making an advance care plan is often done in 3 steps:    Thinking about ones wishes. To create an advance care plan, you should think about what kind of medical treatment you would want if you lose the ability to communicate. Are there any situations in which you would refuse or stop treatment? Are  there therapies you would want or not want? And whom do you want to make decisions for you? There are many places to learn more about how to plan for your care. Ask your doctor or  for resources.    Picking a health care proxy. This means choosing a trusted person to speak for you only when you cant speak for yourself. When you cannot make medical decisions, your proxy makes sure the instructions in your advance care plan are followed. A proxy does not make decisions based on his or her own opinions. They must put aside those opinions and values if needed, and carry out your wishes.    Filling out the legal documents. There are several kinds of legal documents for advance care planning. Each one tells health care providers your wishes. The documents may vary by state. They must be signed and may need to be witnessed or notarized. You can cancel or change them whenever you wish. Depending on your state, the documents may include a Healthcare Proxy form, Living Will, Durable Medical Power of , Advance Directive, or others.  The Familys Role  The best help a family can give is to support their loved ones wishes. Open and honest communication is vital. Family should express any concerns they have about the patients choices while the patient can still make decisions.    9676-0566 [employer]. [address] [place] [address], [place] [address]. All rights reserved. This information is not intended as a substitute for professional medical care. Always follow your healthcare professional's instructions.         Also, [employer] [place] offers a free, downloadable health care directive that allows you to share your treatment choices and personal preferences if you cannot communicate your wishes. It also allows you to appoint another person (called a health care agent) to make health care decisions if you are unable to do so. You can download an advance directive by going here:  [email or web address]     Patient Education   Personalized Prevention Plan  You are due for the preventive services outlined below.  Your care team is available to assist you in scheduling these services.  If you have already completed any of these items, please share that information with your care team to update in your medical record.  Health Maintenance   Topic Date Due   ? DIABETES URINE MICROALBUMIN  12/22/1963   ? COLONOSCOPY  12/22/2003   ? ZOSTER VACCINES (1 of 2) 12/29/2015   ? DIABETES OPHTHALMOLOGY EXAM  10/12/2017   ? PNEUMOCOCCAL CONJUGATE VACCINE FOR ADULTS (PCV13 OR PREVNAR)  12/22/2018   ? DIABETES HEMOGLOBIN A1C  04/30/2019   ? DIABETES FOOT EXAM  07/31/2019   ? DIABETES FOLLOW-UP  09/21/2019   ? FALL RISK ASSESSMENT  03/21/2020   ? ADVANCE DIRECTIVES DISCUSSED WITH PATIENT  08/11/2021   ? TD 18+ HE  10/01/2024   ? PNEUMOCOCCAL POLYSACCHARIDE VACCINE AGE 65 AND OVER  Completed   ? INFLUENZA VACCINE RULE BASED  Completed   ? TDAP ADULT ONE TIME DOSE  Completed

## 2021-06-17 NOTE — PROGRESS NOTES
"  Office Visit - Follow Up   Jamar Lorenzo   62 y.o. male    Date of Visit: 4/25/2018    Chief Complaint   Patient presents with     Diabetes     Hypertension     Mass     on back of neck        Assessment and Plan   1. Type 2 diabetes mellitus without complication, without long-term current use of insulin  Continue metformin aspirin statin, losartan, excellent diabetic foot care and annual diabetic eye exam  - Glycosylated Hemoglobin A1c    2. Essential hypertension  Pressure controlled continue current medication    3. Cerebrovascular accident (CVA), unspecified mechanism  Stable continue secondary prevention    4. Benign prostatic hyperplasia with urinary frequency  Continue Flomax    5. Postoperative hypothyroidism  Continue levothyroxine recent TSH normal    6. Rhinitis, unspecified chronicity, unspecified type  Trial of Flonase    7. Lipoma of neck  Discussed with him that this is probably a lipoma.  Recommended surgical evaluation and he declined at this point as it is not really bothering him    Return in about 3 months (around 7/25/2018) for diabetes follow up.     History of Present Illness   This 62 y.o. old man comes in for follow-up.  Overall is been doing well.  For some time he has had a lump on his neck he would like me to look at.  Does not bother him.  He has had some nasal congestion and drainage.  No sore throat no cough no fever chills no facial pain.  He thinks his blood sugars have been okay.  No lightheadedness or dizziness.  No new neurological deficit.  Nocturia stable    Review of Systems: A comprehensive review of systems was negative except as noted.     Medications, Allergies and Problem List   Reviewed and updated     Physical Exam   General Appearance:   No acute distress    /80 (Patient Site: Left Arm, Patient Position: Sitting, Cuff Size: Adult Regular)  Pulse 98  Ht 5' 9\" (1.753 m)  Wt 176 lb (79.8 kg)  SpO2 98%  BMI 25.99 kg/m2    HEENT exam is unremarkable  Neck " supple no thyromegaly or nodule palpable, probable lipoma posterior neck  Lymphatic no cervical lymphadenopathy  Cardiovascular regular rate and rhythm no murmur gallop or rub  Pulmonary lungs are clear to auscultation bilaterally  Gastrointestinall abdomen soft nontender nondistended no organomegaly  Neurologic exam is non focal  Psychiatric pleasant, no confusion or agitation        Additional Information   Current Outpatient Prescriptions   Medication Sig Dispense Refill     amLODIPine (NORVASC) 10 MG tablet Take 1 tablet (10 mg total) by mouth daily. 90 tablet 3     aspirin 81 MG EC tablet Take 1 tablet (81 mg total) by mouth daily. 90 tablet 3     atorvastatin (LIPITOR) 40 MG tablet Take 1 tablet (40 mg total) by mouth daily. 90 tablet 3     levothyroxine (SYNTHROID, LEVOTHROID) 150 MCG tablet Take 1 tablet (150 mcg total) by mouth Daily at 6:00 am. 90 tablet 3     losartan (COZAAR) 100 MG tablet Take 1 tablet (100 mg total) by mouth daily. 90 tablet 3     metFORMIN (GLUCOPHAGE-XR) 750 MG 24 hr tablet Take 1 tablet (750 mg total) by mouth daily with breakfast. 90 tablet 3     tamsulosin (FLOMAX) 0.4 mg Cp24 Take 1 capsule (0.4 mg total) by mouth daily after supper. 90 capsule 1     triamcinolone (KENALOG) 0.1 % cream Use once daily as needed. 30 g 2     fluticasone (FLONASE) 50 mcg/actuation nasal spray 1-2 sprays into each nostril daily. 16 g 11     No current facility-administered medications for this visit.      Allergies   Allergen Reactions     Sulfamethoxazole-Trimethoprim Rash     Inflamed and painful      Social History   Substance Use Topics     Smoking status: Never Smoker     Smokeless tobacco: Never Used     Alcohol use 0.6 oz/week     1 Glasses of wine per week      Comment: once per week       Review and/or order of clinical lab tests:  Review and/or order of radiology tests:  Review and/or order of medicine tests:  Discussion of test results with performing physician:  Decision to obtain old  records and/or obtain history from someone other than the patient:  Review and summarization of old records and/or obtaining history from someone other than the patient and.or discussion of case with another health care provider:  Independent visualization of image, tracing or specimen itself:    Time:      Main Lowery MD

## 2021-06-18 NOTE — PROGRESS NOTES
"  Office Visit - Follow Up   Jamar Lorenzo   62 y.o. male    Date of Visit: 5/23/2018    Chief Complaint   Patient presents with     Leg Swelling     Both        Assessment and Plan   1. Pitting edema  I think his edema is from amlodipine.  We will stop this and start hydrochlorothiazide and he will follow-up for blood pressure check in 2 weeks.    2. Essential hypertension  As above    3. Postoperative hypothyroidism  Recent TSH normal continue levothyroxine    4. Benign prostatic hyperplasia with urinary frequency  He is getting up a couple times at night, hydrochlorothiazide may actually help with this especially if we can get rid of some of his edema.  Continue Flomax.    Return in about 2 weeks (around 6/6/2018) for recheck.     History of Present Illness   This 62 y.o. old man comes in with about a week of leg swelling.  This is in both legs.  It seems to be pitting.  He has no pain in the legs.  No redness.  No injury.  He has no shortness of breath no chest pain no lightheadedness.  He does have hypertension and is maintained on amlodipine and losartan.  He takes his at 6 AM.  He works from 10 PM until 6 AM.  He sleeps from 2 PM until 9 PM.  He snores a bit but never wakes up gasping for air and generally feels well rested.    Review of Systems: A comprehensive review of systems was negative except as noted.     Medications, Allergies and Problem List   Reviewed and updated     Physical Exam   General Appearance:   No acute distress    /74 (Patient Site: Left Arm, Patient Position: Sitting, Cuff Size: Adult Regular)  Pulse 87  Ht 5' 9\" (1.753 m)  Wt 179 lb (81.2 kg)  SpO2 98%  BMI 26.43 kg/m2    Cardiovascular regular rate and rhythm no murmur gallop or rub lungs clear to auscultation bilaterally abdomen is soft nontender nondistended no organomegaly he has no inguinal lymphadenopathy.  He has bilateral pitting edema up to the level of the knee.  He has no posterior swelling or pain.  Peripheral " pulses are intact.  No changes of venous stasis.     Additional Information   Current Outpatient Prescriptions   Medication Sig Dispense Refill     aspirin 81 MG EC tablet Take 1 tablet (81 mg total) by mouth daily. 90 tablet 3     atorvastatin (LIPITOR) 40 MG tablet Take 1 tablet (40 mg total) by mouth daily. 90 tablet 2     fluticasone (FLONASE) 50 mcg/actuation nasal spray 1-2 sprays into each nostril daily. 16 g 11     levothyroxine (SYNTHROID, LEVOTHROID) 150 MCG tablet Take 1 tablet (150 mcg total) by mouth Daily at 6:00 am. 90 tablet 3     losartan (COZAAR) 100 MG tablet Take 1 tablet (100 mg total) by mouth daily. 90 tablet 2     metFORMIN (GLUCOPHAGE-XR) 750 MG 24 hr tablet Take 1 tablet (750 mg total) by mouth daily with breakfast. 90 tablet 3     tamsulosin (FLOMAX) 0.4 mg Cp24 Take 1 capsule (0.4 mg total) by mouth daily after supper. 90 capsule 1     triamcinolone (KENALOG) 0.1 % cream Use once daily as needed. 30 g 2     hydroCHLOROthiazide (HYDRODIURIL) 12.5 MG tablet Take 1 tablet (12.5 mg total) by mouth daily. 90 tablet 3     No current facility-administered medications for this visit.      Allergies   Allergen Reactions     Lisinopril Swelling     Sulfamethoxazole-Trimethoprim Rash     Inflamed and painful      Social History   Substance Use Topics     Smoking status: Never Smoker     Smokeless tobacco: Never Used     Alcohol use 0.6 oz/week     1 Glasses of wine per week      Comment: once per week       Review and/or order of clinical lab tests:  Review and/or order of radiology tests:  Review and/or order of medicine tests:  Discussion of test results with performing physician:  Decision to obtain old records and/or obtain history from someone other than the patient:  Review and summarization of old records and/or obtaining history from someone other than the patient and.or discussion of case with another health care provider:  Independent visualization of image, tracing or specimen  itself:    Time:      Main Lowery MD

## 2021-06-19 NOTE — PROGRESS NOTES
I LMTCB, PTS APPT ON 07/25 NEEDS TO BE CANCELLED AND RESCHEDULED AS THE DR WILL NOT BE IN THE OFFICE

## 2021-06-19 NOTE — PROGRESS NOTES
"  Office Visit - Follow Up   Jamar Lorenzo   62 y.o. male    Date of Visit: 7/31/2018    Chief Complaint   Patient presents with     Diabetes     Hypertension        Assessment and Plan   1. Essential hypertension  Blood pressure controlled continue current medication  - Basic Metabolic Panel    2. Type 2 diabetes mellitus with circulatory disorder, without long-term current use of insulin (H)  Well-controlled with metformin, aspirin, statin, losartan, annual diabetic eye exam, excellent diabetic foot  - Glycosylated Hemoglobin A1c    3. Postoperative hypothyroidism  Continue levothyroxine  - Thyroid Stimulating Hormone (TSH)    4. Cerebrovascular accident (CVA), unspecified mechanism (H)  Continue secondary    5. Anemia, unspecified type  Long-standing etiology uncertain, check labs, colonoscopy recommended  - HM2(CBC w/o Differential)  - Ferritin  - Vitamin B12  - Folate, Serum    6. Encounter for screening colonoscopy  - Ambulatory referral for Colonoscopy    Return in about 3 months (around 10/31/2018) for recheck.     History of Present Illness   This 62 y.o. old man comes in for follow-up.  Overall is doing well.  He is not checking his blood pressures.  Not checking his blood sugars.  No lightheadedness dizziness.  No chest pain.  Ongoing slight abnormality in the left side from his stroke.  No new deficits.  Urinary symptoms stable per    Review of Systems: A comprehensive review of systems was negative except as noted.     Medications, Allergies and Problem List   Reviewed and updated     Physical Exam   General Appearance:   No acute distress    /86 (Patient Site: Left Arm, Patient Position: Sitting, Cuff Size: Adult Regular)  Pulse 90  Ht 5' 9\" (1.753 m)  Wt 173 lb (78.5 kg)  SpO2 98%  BMI 25.55 kg/m2    HEENT exam is unremarkable  Neck supple no thyromegaly or nodule palpable  Lymphatic no cervical lymphadenopathy  Cardiovascular regular rate and rhythm no murmur gallop or rub  Pulmonary " lungs are clear to auscultation bilaterally  Gastrointestinall abdomen soft nontender nondistended no organomegaly  Neurologic exam is non focal  Psychiatric pleasant, no confusion or agitation        Additional Information   Current Outpatient Prescriptions   Medication Sig Dispense Refill     aspirin 81 MG EC tablet Take 1 tablet (81 mg total) by mouth daily. 90 tablet 3     atorvastatin (LIPITOR) 40 MG tablet Take 1 tablet (40 mg total) by mouth daily. 90 tablet 2     fluticasone (FLONASE) 50 mcg/actuation nasal spray 1-2 sprays into each nostril daily. 16 g 11     hydroCHLOROthiazide (HYDRODIURIL) 12.5 MG tablet Take 1 tablet (12.5 mg total) by mouth daily. 90 tablet 3     levothyroxine (SYNTHROID, LEVOTHROID) 150 MCG tablet Take 1 tablet (150 mcg total) by mouth Daily at 6:00 am. 90 tablet 3     losartan (COZAAR) 100 MG tablet Take 1 tablet (100 mg total) by mouth daily. 90 tablet 2     metFORMIN (GLUCOPHAGE-XR) 750 MG 24 hr tablet Take 1 tablet (750 mg total) by mouth daily with breakfast. 90 tablet 3     tamsulosin (FLOMAX) 0.4 mg Cp24 Take 1 capsule (0.4 mg total) by mouth daily after supper. 90 capsule 1     triamcinolone (KENALOG) 0.1 % cream Use once daily as needed. 30 g 2     No current facility-administered medications for this visit.      Allergies   Allergen Reactions     Lisinopril Swelling     Sulfamethoxazole-Trimethoprim Rash     Inflamed and painful      Social History   Substance Use Topics     Smoking status: Never Smoker     Smokeless tobacco: Never Used     Alcohol use 0.6 oz/week     1 Glasses of wine per week      Comment: once per week       Review and/or order of clinical lab tests:  Review and/or order of radiology tests:  Review and/or order of medicine tests:  Discussion of test results with performing physician:  Decision to obtain old records and/or obtain history from someone other than the patient:  Review and summarization of old records and/or obtaining history from someone  other than the patient and.or discussion of case with another health care provider:  Independent visualization of image, tracing or specimen itself:    Time:      Main Lowery MD

## 2021-06-21 NOTE — PROGRESS NOTES
Office Visit - Follow Up   Jamar Lorenzo   62 y.o. male    Date of Visit: 10/31/2018    Chief Complaint   Patient presents with     Diabetes     Hypertension     Medication Problem     flomax isn't working        Assessment and Plan   1. Type 2 diabetes mellitus with circulatory disorder, without long-term current use of insulin (H)  This is been well-controlled continue current plan  - Glycosylated Hemoglobin A1c  - Basic Metabolic Panel    2. Need for prophylactic vaccination and inoculation against influenza  - Influenza, Seasonal Quad, Preservative Free 36+ Months    3. Essential hypertension  Blood pressure controlled continue current medication    4. Postoperative hypothyroidism  Tinea levothyroxine    5. Benign prostatic hyperplasia with urinary frequency  Continue Flomax, prostate likely enlarged on exam.  Check PSA.  Discuss finasteride and he is hesitant to start this medication due to risk of erectile dysfunction.  We will have him see urology.  - Ambulatory referral to Urology    6. Screening for prostate cancer  - PSA (Prostatic-Specific Antigen), Annual Screen    7. Left knee pain  Recommended acetaminophen arthritis strength  - XR Knee Left 1 or 2 VWS; Future    8. Anemia  Continue to recommend colonoscopy  - HM2(CBC w/o Differential)    Return in about 3 months (around 1/31/2019) for diabetes follow up.     History of Present Illness   This 62 y.o. old man comes in for follow-up.  Overall he is been doing okay.  He has diabetes is generally been well controlled.  His main issue is that he has difficulty with initiating urination and slow stream.  He gets up frequently at night and has to go to the bathroom frequently during the day.  We tried Flomax and this has only provided minimal benefit.  He has no issues with erections.  He is hesitant to start any medication that could interfere with erections.  He otherwise has been feeling okay, some left knee pain.    Review of Systems: A comprehensive  "review of systems was negative except as noted.     Medications, Allergies and Problem List   Reviewed, reconciled and updated     Physical Exam   General Appearance:   No acute distress    /80 (Patient Site: Left Arm, Patient Position: Sitting, Cuff Size: Adult Regular)  Pulse 66  Ht 5' 9\" (1.753 m)  Wt 173 lb (78.5 kg)  SpO2 96%  BMI 25.55 kg/m2    HEENT exam is unremarkable  Neck supple no thyromegaly or nodule palpable  Lymphatic no cervical lymphadenopathy  Cardiovascular regular rate and rhythm no murmur gallop or rub  Pulmonary lungs are clear to auscultation bilaterally  Gastrointestinall abdomen soft nontender nondistended no organomegaly  Neurologic exam is non focal  Psychiatric pleasant, no confusion or agitation   Prostate does feel enlarged, no nodule palpated  Generally unremarkable left knee exam     Additional Information   Current Outpatient Prescriptions   Medication Sig Dispense Refill     aspirin 81 MG EC tablet Take 1 tablet (81 mg total) by mouth daily. 90 tablet 3     atorvastatin (LIPITOR) 40 MG tablet Take 1 tablet (40 mg total) by mouth daily. 90 tablet 2     fluticasone (FLONASE) 50 mcg/actuation nasal spray 1-2 sprays into each nostril daily. 16 g 11     hydroCHLOROthiazide (HYDRODIURIL) 12.5 MG tablet Take 1 tablet (12.5 mg total) by mouth daily. 90 tablet 3     levothyroxine (SYNTHROID, LEVOTHROID) 150 MCG tablet Take 1 tablet (150 mcg total) by mouth Daily at 6:00 am. 90 tablet 3     losartan (COZAAR) 100 MG tablet Take 1 tablet (100 mg total) by mouth daily. 90 tablet 2     metFORMIN (GLUCOPHAGE-XR) 750 MG 24 hr tablet Take 1 tablet (750 mg total) by mouth daily with breakfast. 90 tablet 3     omega-3/dha/epa/fish oil (FISH OIL-OMEGA-3 FATTY ACIDS) 300-1,000 mg capsule Take 1 capsule (1 g total) by mouth daily. 90 capsule 3     tamsulosin (FLOMAX) 0.4 mg Cp24 Take 1 capsule (0.4 mg total) by mouth daily after supper. 90 capsule 1     triamcinolone (KENALOG) 0.1 % cream Use " once daily as needed. 30 g 2     acetaminophen (TYLENOL) 650 MG CR tablet Take 2 tablets (1,300 mg total) by mouth every 8 (eight) hours as needed for pain. 180 tablet 11     No current facility-administered medications for this visit.      Allergies   Allergen Reactions     Lisinopril Swelling     Sulfamethoxazole-Trimethoprim Rash     Inflamed and painful      Social History   Substance Use Topics     Smoking status: Never Smoker     Smokeless tobacco: Never Used     Alcohol use 0.6 oz/week     1 Glasses of wine per week      Comment: once per week       Review and/or order of clinical lab tests:  Review and/or order of radiology tests:  Review and/or order of medicine tests:  Discussion of test results with performing physician:  Decision to obtain old records and/or obtain history from someone other than the patient:  Review and summarization of old records and/or obtaining history from someone other than the patient and.or discussion of case with another health care provider:  Independent visualization of image, tracing or specimen itself:    Time:      Main Lowery MD

## 2021-06-22 NOTE — TELEPHONE ENCOUNTER
RN cannot approve Refill Request    RN can NOT refill this medication med is not covered by policy/route to provider     . Last office visit: 12/20/2018 Main Lowery MD Last Physical: 8/11/2016 Last MTM visit: Visit date not found Last visit same specialty: 12/20/2018 Main Lowery MD.  Next visit within 3 mo: Visit date not found  Next physical within 3 mo: Visit date not found      Angelia Sosa, [facility] Triage/Med Refill 1/6/2019    Requested Prescriptions   Pending Prescriptions Disp Refills     polyvinyl alcohol (ARTIFICIAL TEARS, POLYVIN ALC,) 1.4 % ophthalmic solution [Pharmacy Med Name: Artificial Tears Ophthalmic Solution 1.4 %] 15 mL 0     Sig: Apply one drop into each eye four times daily as needed for dry eyes.    There is no refill protocol information for this order

## 2021-06-22 NOTE — TELEPHONE ENCOUNTER
Who is calling:  Patient   Reason for Call:  Patient was calling to verify appointment date to [facility] . Patient states he went to clinic for Prostates biopsy 12/31/18 and Clinic stated they had no appointment time for him. Patient states he was sent antibiotic that he had taken 2 hours prior to procedure when in-fact no procedure existed that day. CMA called [facility] and patient had in-fact showed up at the wrong date. Procedure date is 1/31/19 at 8 am . [facility] called and advised patient of current date and times for procedure also gave Patient information to follow up himself . This issue was resolved.    Date of last appointment with primary care: NA  Has the patient been recently seen:  10/31/18  Okay to leave a detailed message: No

## 2021-06-22 NOTE — PROGRESS NOTES
Office Visit - Follow Up   Jamar Lorenzo   62 y.o. male    Date of Visit: 12/20/2018    Chief Complaint   Patient presents with     Knee Pain     pt states left knee pain, feels like throbbing pain sometimes snap in knee        Assessment and Plan   1. Inflammation of joint of left knee  Unclear etiology but inflammatory nature.  Consider gout or pseudogout.  Check labs as below, trial of prednisone 30 mg daily for 5 days, discussed that this might elevate his blood sugars.  If pain persists or does not improve he will return for follow-up and further evaluation.  We discussed orthopedic consultation.  X-ray from earlier this fall reviewed mild degenerative changes mild effusion.  - Uric Acid  - Erythrocyte Sedimentation Rate  - C-Reactive Protein    2. Type 2 diabetes mellitus with other circulatory complication, without long-term current use of insulin (H)  As above discussed that blood sugars may increase with prednisone and monitor closely    3. Essential hypertension  Blood pressure controlled continue current medications    Return in about 2 weeks (around 1/3/2019) for recheck.     History of Present Illness   This 62 y.o. old man comes in for left knee pain.  Bothering him for some time but it seems to be worsening.  It hurts more with walking and range of motion.  He feels like it is a bit swollen.  No fever or chills.  No known injury.  No history of gout.  No history of inflammatory arthritis.  Not responsive to acetaminophen.    Review of Systems: A comprehensive review of systems was negative except as noted.     Medications, Allergies and Problem List   Reviewed, reconciled and updated     Physical Exam   General Appearance:   No acute distress    /90 (Patient Site: Right Arm, Patient Position: Sitting, Cuff Size: Adult Regular)   Pulse 80   Resp 16   Wt 173 lb (78.5 kg)   SpO2 98%   BMI 25.55 kg/m      He has left knee swelling, increased warmth, effusion, mild redness, pain with range of  motion ligaments intact no other hot or swollen joints heart rate controlled lungs clear abdomen soft     Additional Information   Current Outpatient Medications   Medication Sig Dispense Refill     acetaminophen (TYLENOL) 650 MG CR tablet Take 2 tablets (1,300 mg total) by mouth every 8 (eight) hours as needed for pain. (Patient taking differently: Take 1,300 mg by mouth every 8 (eight) hours as needed for pain (Patient takes 650 mg every 8 hours as needed) .      ) 180 tablet 11     aspirin 81 MG EC tablet Take 1 tablet (81 mg total) by mouth daily. 90 tablet 3     atorvastatin (LIPITOR) 40 MG tablet Take 1 tablet (40 mg total) by mouth daily. 90 tablet 2     fluticasone (FLONASE) 50 mcg/actuation nasal spray 1-2 sprays into each nostril daily. 16 g 11     hydroCHLOROthiazide (HYDRODIURIL) 12.5 MG tablet Take 1 tablet (12.5 mg total) by mouth daily. 90 tablet 3     levothyroxine (SYNTHROID, LEVOTHROID) 150 MCG tablet Take 1 tablet (150 mcg total) by mouth Daily at 6:00 am. 90 tablet 2     losartan (COZAAR) 100 MG tablet Take 1 tablet (100 mg total) by mouth daily. 90 tablet 2     metFORMIN (GLUCOPHAGE-XR) 750 MG 24 hr tablet Take 1 tablet (750 mg total) by mouth daily with breakfast. 90 tablet 3     multivitamin therapeutic tablet Take 1 tablet by mouth daily.       omega-3/dha/epa/fish oil (FISH OIL-OMEGA-3 FATTY ACIDS) 300-1,000 mg capsule Take 1 capsule (1 g total) by mouth daily. 90 capsule 3     polyvinyl alcohol (LIQUIFILM TEARS) 1.4 % ophthalmic solution Apply one drop into each eye four times daily as needed for dry eyes. 15 mL 1     tamsulosin (FLOMAX) 0.4 mg Cp24 Take 1 capsule (0.4 mg total) by mouth daily after supper. 90 capsule 1     triamcinolone (KENALOG) 0.1 % cream Use once daily as needed.. 30 g 2     predniSONE (DELTASONE) 10 mg tablet Take 30 mg by mouth daily for 5 days. 15 tablet 0     No current facility-administered medications for this visit.      Allergies   Allergen Reactions      Lisinopril Swelling     Sulfamethoxazole-Trimethoprim Rash     Inflamed and painful      Social History     Tobacco Use     Smoking status: Never Smoker     Smokeless tobacco: Never Used   Substance Use Topics     Alcohol use: Yes     Alcohol/week: 0.6 oz     Types: 1 Glasses of wine per week     Comment: once per week     Drug use: No       Review and/or order of clinical lab tests:  Review and/or order of radiology tests:  Review and/or order of medicine tests:  Discussion of test results with performing physician:  Decision to obtain old records and/or obtain history from someone other than the patient:  Review and summarization of old records and/or obtaining history from someone other than the patient and.or discussion of case with another health care provider:  Independent visualization of image, tracing or specimen itself:    Time:      Main Lowery MD

## 2021-06-23 NOTE — TELEPHONE ENCOUNTER
RN cannot approve Refill Request    RN can NOT refill this medication overdue for office visits and/or labs.    Chago Krueger, [facility] Triage/Med Refill 2/12/2019    Requested Prescriptions   Pending Prescriptions Disp Refills     metFORMIN (GLUCOPHAGE-XR) 750 MG 24 hr tablet [Pharmacy Med Name: metFORMIN HCl ER Oral Tablet Extended Release 24 Hour 750 MG] 30 tablet 2     Sig: Take 1 tablet (750 mg total) by mouth daily with breakfast.    Metformin Refill Protocol Failed - 2/10/2019 11:29 AM       Failed - LFT or AST or ALT in last 12 months    Albumin   Date Value Ref Range Status   08/11/2016 4.2 3.5 - 5.0 g/dL Final     Bilirubin, Total   Date Value Ref Range Status   08/11/2016 0.6 0.2 - 1.0 mg/dL Final     Alkaline Phosphatase   Date Value Ref Range Status   08/11/2016 82 40 - 112 U/L Final     Comment:       New Alkaline Phosphatase method with new reference ranges as of 2/18/15.     AST   Date Value Ref Range Status   08/11/2016 15 15 - 37 U/L Final     ALT   Date Value Ref Range Status   08/11/2016 21 12 - 78 U/L Final     Protein, Total   Date Value Ref Range Status   08/11/2016 8.3 (H) 6.4 - 8.2 g/dL Final               Failed - Microalbumin in last year     No results found for: MICROALBUR              Passed - Blood pressure in last 12 months    BP Readings from Last 1 Encounters:   12/20/18 126/90            Passed - GFR or Serum Creatinine in last 6 months    GFR MDRD Non Af Amer   Date Value Ref Range Status   11/18/2018 >60 >60 mL/min/1.73m2 Final     GFR MDRD Af Amer   Date Value Ref Range Status   11/18/2018 >60 >60 mL/min/1.73m2 Final            Passed - Visit with PCP or prescribing provider visit in last 6 months or next 3 months    Last office visit with prescriber/PCP: 12/20/2018 OR same dept: 12/20/2018 Main Lowery MD OR same specialty: 12/20/2018 Main Lowery MD Last physical: Visit date not found Last MTM visit: Visit date not found         Next appt within 3  mo: Visit date not found  Next physical within 3 mo: Visit date not found  Prescriber OR PCP: Main Lowery MD  Last diagnosis associated with med order: There are no diagnoses linked to this encounter.   If protocol passes may refill for 12 months if within 3 months of last provider visit (or a total of 15 months).          Passed - A1C in last 6 months    Hemoglobin A1c   Date Value Ref Range Status   10/31/2018 6.9 (H) 3.5 - 6.0 % Final

## 2021-06-25 NOTE — PROGRESS NOTES
Assessment and Plan:   1. Type 2 diabetes mellitus with other circulatory complication, without long-term current use of insulin (H)  This has been well controlled, continue metformin, aspirin, statin, losartan, annual diabetic eye exam and excellent diabetic foot care  - Lipid Cascade  - Glycosylated Hemoglobin A1c  - Comprehensive Metabolic Panel    2. Routine general medical examination at a health care facility  This is a 63-year-old man with issues as discussed above and below    3. Screening for prostate cancer  Needs to follow-up with urology  - PSA (Prostatic-Specific Antigen), Annual Screen    4. Cerebrovascular accident (CVA), unspecified mechanism (H)  Stable continue secondary prevention    5. Essential hypertension  Blood pressure looks okay continue current medication    6. Postoperative hypothyroidism  Continue levothyroxine  - Thyroid Stimulating Hormone (TSH)    7. Benign prostatic hyperplasia with urinary frequency  Continue Flomax follow-up with urology    8. Chronic pain of left knee  Likely osteoarthritis, possible gout although less likely.  I would like him to see orthopedic surgery for further evaluation and management  - Ambulatory referral to Orthopedic Surgery    9. Anemia, unspecified type  Has been mild, recommend a colonoscopy and he is declined  - HM2(CBC w/o Differential)    10. Advance care planning  We had an 18-minute discussion today.  He does not have an advanced healthcare directive.  His wife would be his surrogate medical decision-maker.  He remains full code.  He will be okay with short-term intubation, short-term tube feeding, short-term dialysis.  He would not want ongoing care if he had no chance for meaningful recovery.    The patient's current medical problems were reviewed.    I have had an Advance Directives discussion with the patient.  The following health maintenance schedule was reviewed with the patient and provided in printed form in the after visit summary:    Health Maintenance   Topic Date Due     DIABETES URINE MICROALBUMIN  12/22/1965     COLONOSCOPY  12/22/2003     ZOSTER VACCINES (1 of 2) 12/29/2015     DIABETES OPHTHALMOLOGY EXAM  10/12/2017     PNEUMOCOCCAL CONJUGATE VACCINE FOR ADULTS (PCV13 OR PREVNAR)  12/22/2018     DIABETES HEMOGLOBIN A1C  04/30/2019     DIABETES FOOT EXAM  07/31/2019     DIABETES FOLLOW-UP  09/21/2019     FALL RISK ASSESSMENT  03/21/2020     ADVANCE DIRECTIVES DISCUSSED WITH PATIENT  08/11/2021     TD 18+ HE  10/01/2024     PNEUMOCOCCAL POLYSACCHARIDE VACCINE AGE 65 AND OVER  Completed     INFLUENZA VACCINE RULE BASED  Completed     TDAP ADULT ONE TIME DOSE  Completed        Subjective:   Chief Complaint: Jamar Lorenzo is an 63 y.o. male here for an Annual Wellness visit.   HPI: This 63-year-old man comes in for annual wellness visit, follow-up of numerous medical problems.  Overall is been feeling a bit fatigued.  Fairly nondescript.  Some left knee pain and swelling.  Did seem to respond a bit to 5-day course of prednisone.  But now painful again.  Hurts with activity and stiff after rest.  He was to have a prostate biopsy for elevated PSA but apparently he missed the appointment.  He is going to call urology to reschedule.  But sugars have been okay.  No chest pain no shortness of breath no nausea vomiting or diarrhea.    Review of Systems:   Please see above.  The rest of the review of systems are negative for all systems.    Patient Care Team:  Main Lowery MD as PCP - General (Internal Medicine)     Patient Active Problem List   Diagnosis     Type 2 diabetes mellitus with circulatory disorder, without long-term current use of insulin (H)     Hypothyroidism     HTN (hypertension)     Stroke (H)     Benign prostatic hyperplasia with lower urinary tract symptoms     Past Medical History:   Diagnosis Date     Diabetes mellitus (H)      Hypertension      Hypothyroidism     HYPOTHYROID     Shoulder pain, right 10/4/2015      Stroke (H)       Past Surgical History:   Procedure Laterality Date     THYROIDECTOMY        Family History   Problem Relation Age of Onset     No Medical Problems Mother      No Medical Problems Father      Diabetes Sister      No Medical Problems Brother      No Medical Problems Daughter      No Medical Problems Son      No Medical Problems Maternal Aunt      No Medical Problems Maternal Uncle      No Medical Problems Paternal Aunt      No Medical Problems Paternal Uncle      No Medical Problems Maternal Grandmother      No Medical Problems Maternal Grandfather      No Medical Problems Paternal Grandmother      No Medical Problems Paternal Grandfather       Social History     Socioeconomic History     Marital status:      Spouse name: Not on file     Number of children: Not on file     Years of education: Not on file     Highest education level: Not on file   Occupational History     Not on file   Social Needs     Financial resource strain: Not on file     Food insecurity:     Worry: Not on file     Inability: Not on file     Transportation needs:     Medical: Not on file     Non-medical: Not on file   Tobacco Use     Smoking status: Never Smoker     Smokeless tobacco: Never Used   Substance and Sexual Activity     Alcohol use: Yes     Alcohol/week: 0.6 oz     Types: 1 Glasses of wine per week     Comment: once per week     Drug use: No     Sexual activity: Not on file   Lifestyle     Physical activity:     Days per week: Not on file     Minutes per session: Not on file     Stress: Not on file   Relationships     Social connections:     Talks on phone: Not on file     Gets together: Not on file     Attends [employer] service: Not on file     Active member of club or organization: Not on file     Attends meetings of clubs or organizations: Not on file     Relationship status: Not on file     Intimate partner violence:     Fear of current or ex partner: Not on file     Emotionally abused: Not on file      Physically abused: Not on file     Forced sexual activity: Not on file   Other Topics Concern     Not on file   Social History Narrative    Lives with Corrine Ash.  Works as a certified nurse assistant.  Six children.  Originally from [facility].        Current Outpatient Medications   Medication Sig Dispense Refill     acetaminophen (TYLENOL) 650 MG CR tablet Take 2 tablets (1,300 mg total) by mouth every 8 (eight) hours as needed for pain. (Patient taking differently: Take 1,300 mg by mouth every 8 (eight) hours as needed for pain (Patient takes 650 mg every 8 hours as needed) .      ) 180 tablet 11     aspirin 81 MG EC tablet Take 1 tablet (81 mg total) by mouth daily. 90 tablet 3     atorvastatin (LIPITOR) 40 MG tablet Take 1 tablet by mouth daily. 30 tablet 1     fluticasone (FLONASE) 50 mcg/actuation nasal spray 1-2 sprays into each nostril daily. 16 g 11     hydroCHLOROthiazide (HYDRODIURIL) 12.5 MG tablet Take 1 tablet (12.5 mg total) by mouth daily. 90 tablet 3     levothyroxine (SYNTHROID, LEVOTHROID) 150 MCG tablet Take 1 tablet (150 mcg total) by mouth Daily at 6:00 am. 90 tablet 2     losartan (COZAAR) 100 MG tablet Take 1 tablet (100 mg total) by mouth daily. 90 tablet 2     metFORMIN (GLUCOPHAGE-XR) 750 MG 24 hr tablet Take 1 tablet (750 mg total) by mouth daily with breakfast. 30 tablet 2     multivitamin therapeutic tablet Take 1 tablet by mouth daily.       omega-3/dha/epa/fish oil (FISH OIL-OMEGA-3 FATTY ACIDS) 300-1,000 mg capsule Take 1 capsule (1 g total) by mouth daily. 90 capsule 3     polyvinyl alcohol (ARTIFICIAL TEARS, POLYVIN ALC,) 1.4 % ophthalmic solution Apply one drop into each eye four times daily as needed for dry eyes. 15 mL 10     tamsulosin (FLOMAX) 0.4 mg Cp24 Take 1 capsule (0.4 mg total) by mouth daily after supper. 90 capsule 1     triamcinolone (KENALOG) 0.1 % cream Use once daily as needed.. 30 g 2     No current facility-administered medications for this visit.      "  Objective:   Vital Signs:   Visit Vitals  /86 (Patient Site: Left Arm, Patient Position: Sitting, Cuff Size: Adult Regular)   Pulse 79   Ht 5' 9\" (1.753 m)   Wt 175 lb (79.4 kg)   SpO2 97%   BMI 25.84 kg/m         VisionScreening:   Visual Acuity Screening    Right eye Left eye Both eyes   Without correction: 20 50 20 25  20 25   With correction:           PHYSICAL EXAM  EYES: Eyelids, conjunctiva, and sclera were normal. Pupils were normal. Cornea, iris, and lens were normal bilaterally.  HEAD, EARS, NOSE, MOUTH, AND THROAT: Head and face were normal. Hearing was normal to voice and the ears were normal to external exam. Nose appearance was normal and there was no discharge. Oropharynx was normal.  NECK: Neck appearance was normal. There were no neck masses and the thyroid was not enlarged.  RESPIRATORY: Breathing pattern was normal and the chest moved symmetrically.  Percussion/auscultatory percussion was normal.  Lung sounds were normal and there were no abnormal sounds.  CARDIOVASCULAR: Heart rate and rhythm were normal.  S1 and S2 were normal and there were no extra sounds or murmurs. Peripheral pulses in arms and legs were normal.  Jugular venous pressure was normal.  There was no peripheral edema.  GASTROINTESTINAL: The abdomen was normal in contour.  Bowel sounds were present.  Percussion detected no organ enlargement or tenderness.  Palpation detected no tenderness, mass, or enlarged organs.   MUSCULOSKELETAL: Skeletal configuration was normal and muscle mass was normal for age. Joint appearance was overall normal.  LYMPHATIC: There were no enlarged nodes.  SKIN/HAIR/NAILS: Skin color was normal.  There were no skin lesions.  Hair and nails were normal.  NEUROLOGIC: The patient was alert and oriented to person, place, time, and circumstance. Speech was normal. Cranial nerves were normal. Motor strength was normal for age.  Mild incoordination on the right side  PSYCHIATRIC:  Mood and affect were " normal and the patient had normal recent and remote memory. The patient's judgment and insight were normal.    Assessment Results 3/21/2019   Activities of Daily Living No help needed   Instrumental Activities of Daily Living No help needed   Mini Cog Total Score 5   Some recent data might be hidden     A Mini-Cog score of 0-2 suggests the possibility of dementia, score of 3-5 suggests no dementia    Identified Health Risks:     The patient was provided with suggestions to help him develop a healthy physical lifestyle.   The patient reports that he does not have all recommended working emergency equipment available. He was provided with information about emergency preparedness, including smoke detectors.  Information on urinary incontinence and treatment options given to patient.  He is at risk for falling and has been provided with information to reduce the risk of falling at home.  Information regarding advance directives (living [place]), including where he can download the appropriate form, was provided to the patient via the AVS.

## 2021-06-25 NOTE — PROGRESS NOTES
Progress Notes by Kendall Guo PT at 9/19/2017  9:00 AM     Author: Kendall Guo PT Service: -- Author Type: Physical Therapist    Filed: 9/19/2017  3:04 PM Encounter Date: 9/19/2017 Status: Attested    : Kendall Guo PT (Physical Therapist) Cosigner: Anca Gill CNP at 9/20/2017  8:03 AM    Attestation signed by Anca Gill CNP at 9/20/2017  8:03 AM    Follow therapists recommendations please                    [facility] Certification Request    September 19, 2017      Patient: Jamar Lorenzo  MR Number: [id number]  YOB: 1955  Date of Visit: 9/19/2017      Dear Dr. Anca Gill:    Thank you for this referral.   We are seeing Jamar Lorenzo in Physical Therapy for cervical radiculopathy.    Medicare and/or Medicaid requires physician review and approval of the treatment plan. Please review the plan of care and verify that you agree with the therapy plan of care by co-signing this note.      Plan of Care  Authorization / Certification Start Date: 09/19/17  Authorization / Certification End Date: 11/18/17  Authorization / Certification Number of Visits: 12  Communication with: Referral Source  Patient Related Instruction: Nature of Condition;Treatment plan and rationale;Self Care instruction;Basis of treatment;Body mechanics;Posture;Precautions;Next steps;Expected outcome  Times per Week: 2  Number of Weeks: 4-6  Number of Visits: 12  Precautions / Restrictions : DM, h/o CVA with R-sided weakness  Therapeutic Exercise: ROM;Stretching;Strengthening  Neuromuscular Reeducation: kinesio tape;posture;core  Manual Therapy: soft tissue mobilization;myofascial release;joint mobilization;muscle energy  Modalities: traction;electrical stimulation    Goals:  Pt. will be independent with home exercise program in : 4 weeks  Patient will twist/turn : in bed;with no pain;for bed mobilitiy;in 4 weeks  Patient Turn Head: for watching traffic;in 4  weeks;Comment;with less pain  Comment: >= 65* L/R  Patient will look up / down: for reading;for drinking;with less pain;in 4 weeks;Comment  Comment: >= 40* flex/extension  Patient will decrease : NDI score;for improved quality of life;in 4 weeks;by _ points  by ___ points: >= 5      If you have any questions or concerns, please don't hesitate to call.    Sincerely,      Kendall Guo, PT        Physician recommendation:     ___ Follow therapist's recommendation        ___ Modify therapy      *Physician co-signature indicates they certify the need for these services furnished within this plan and while under their care.        [facility]   Initial Evaluation    Patient Name: Jamar Lorenzo  Date of evaluation: 9/19/2017  PRECAUTIONS: DM, h/o CVA  Referral Diagnosis:   723.4 (ICD-9-CM) - G54.2 (ICD-10-CM) - Cervical nerve root compression   782.0 (ICD-9-CM) - R20.0 (ICD-10-CM) - Numbness of right hand   723.4 (ICD-9-CM) - M54.12 (ICD-10-CM) - Radiculitis of left cervical region     Referring provider: Anca Gill C*  Visit Diagnosis:     ICD-10-CM    1. Left cervical radiculopathy M54.12    2. Right cervical radiculopathy M54.12    3. Decreased ROM of neck R29.898    4. Abnormal posture R29.3        Assessment:      Pt. is appropriate for skilled PT intervention as outlined in the Plan of Care (POC).  Pt. is a good candidate for skilled PT services to improve pain levels and function.  Signs/sx consistent with bilateral cervical radiculopathy with likely cervical stenosis component, with possible left-sided shoulder impingement dysfunction. HEP initiated today, and patient with fair>good tolerance for all ex. Reports decreased B UE pain and paresthesias intermittently with cervical distraction.     Goals:  Pt. will be independent with home exercise program in : 4 weeks  Patient will twist/turn : in bed;with no pain;for bed mobilitiy;in 4 weeks  Patient Turn Head: for watching traffic;in 4  weeks;Comment;with less pain  Comment: >= 65* L/R  Patient will look up / down: for reading;for drinking;with less pain;in 4 weeks;Comment  Comment: >= 40* flex/extension  Patient will decrease : NDI score;for improved quality of life;in 4 weeks;by _ points  by ___ points: >= 5    Patient's expectations/goals are realistic.    Barriers to Learning or Achieving Goals:  Financial situation.  Language barriers.   H/o CVA with R-sided weakness       Plan / Patient Instructions:        Plan of Care:   Authorization / Certification Start Date: 09/19/17  Authorization / Certification End Date: 11/18/17  Authorization / Certification Number of Visits: 12  Communication with: Referral Source  Patient Related Instruction: Nature of Condition;Treatment plan and rationale;Self Care instruction;Basis of treatment;Body mechanics;Posture;Precautions;Next steps;Expected outcome  Times per Week: 2  Number of Weeks: 4-6  Number of Visits: 12  Precautions / Restrictions : DM, h/o CVA with R-sided weakness  Therapeutic Exercise: ROM;Stretching;Strengthening  Neuromuscular Reeducation: kinesio tape;posture;core  Manual Therapy: soft tissue mobilization;myofascial release;joint mobilization;muscle energy  Modalities: traction;electrical stimulation    Plan for next visit: UBE > review HEP, adding TB rows/pull downs to HEP. Continue manual cervical distraction.     Subjective:       History of Present Illness:    Jamar is a 61 y.o. male who presents to therapy today with complaints of neck pain and B UE pain, left>right.  L UE pain travels into the back of the shoulder and posterior upper arm. R UE pain and paresthesias travels into the hand and 2nd/3rd/4th digits.   Onset: Paresthesias started about 3 or 4 months ago. Pain started a few months before that.  Duration: Intermittent  Worse with turning head/neck to the left/right, looking up and down, sitting upright, turning over in bed, lying on the L side  Better with pain  "medication  Pain Medication: gabapentin and Tylenol, 3x/day  Sleep: Denies waking due to pain.  Had [facility], but was unable to tolerate prone lying, so injection was aborted.  No previous spine or shoulder surgeries, but with previous thyroid surgery in .  No pacemaker or defibrillator.    PER EMR on : \"SUBJECTIVE:   Jamar Lorenzo  is a 61 y.o. male who presents today for new patient evaluation of most significant left trapezius, anterior deltoid and anterior bicep pain without any further arm pain that is chronic in nature however significant ×2 months in which he currently rates his pain a 7/10 worse with movement of his neck.  He does report also numbness and tingling on the right arm into the second third and fourth fingers ongoing since 2017 that is also worse with moving his neck.   Patient denies any upper extremity weakness, denies fine motor skill difficulty, denies bowel or bladder dysfunction, denies balance changes or recent trips or falls.   **Patient was evaluated by Dr. Turner on 2017 for his spinal canal stenosis at L4-5 and left foraminal narrowing, she recommended consultation here at the spine center for injection and conservative management.   No myelopathic or red flag symptoms.\"    Pt seeks PT to help my hands.\"    Pain Ratin  Pain description: aching, burning, dull, numbness, pain, sharp, soreness, tingling and weakness     Objective:      Patient Outcome Measures :    Neck Disability Score in %: 11   Scores range from 0-100%, where a score of 0% represents minimal pain and maximal function. The minmal clinically important difference is a score reduction of 10%.     PER EMR:  XR CERVICAL SPINE 2 - 3 VWS  2017 8:35 AM     INDICATION: Cervicalgia.  COMPARISON: CTA head and neck examination performed 10/5/2015.     FINDINGS:      Mild, broad levoconvex curvature apex at T3. Straightening of the normal cervical lordosis. Minimal " retrolisthesis of C4 on C5. Vertebral body height is preserved, allowing for degenerative changes. Intervertebral height loss is moderate to severe in   degree at C4-C5, and relatively moderate at C5-C6 and C6-C7.     The prevertebral soft tissues are within normal limits.     This report was electronically interpreted by: Dr. Margarita Kelly MD ON 07/24/2017 at 09:12    [facility]  MR LTD CERVICAL SPINE WO CONTRAST  8/17/2017 11:02 AM      INDICATION: Neck pain. Abnormal neuro exam.  TECHNIQUE: Sagittal T1, T2, STIR and 3-D images acquired. No axial images acquired secondary to significant pain.  CONTRAST: None.  COMPARISON: Cervical spine radiographic exam 07/20/2017. CT angiogram head and neck 10/05/2015.     FINDINGS: Mild straightening of the cervical spine. Minimal retrolisthesis C4 on C5 without anterolisthesis or significant coronal curvature. Vertebral body heights are maintained. Multilevel discogenic endplate changes particularly at C3-C4, C4-C5 and   C5-C6 with posterior projecting disc osteophyte complexes particularly severe on the right at C4-C5 and C5-C6. Effacement of the ventral thecal sac at C3-C4, C4-C5, C5-C6 and C6-C7 with spinal stenosis most severe C4-C5. Craniocervical junction intact.   Marked degenerative change at C1-C2. No definitive abnormal cord signal intensity on this limited sagittal only exam. The visualized intracranial contents are unremarkable. Grossly unremarkable paraspinal soft tissues. Multilevel degenerative disc   desiccation and disc height loss particularly from C3 through C7. Segmental ossification of the anterior longitudinal ligament at C2-C3.      Facet arthropathy on the right is most severe at C3-C4 and on the left at C2-C3. Severe uncovertebral joint osteoarthritis on the right at C4-C5 and at C5-C6. Likely high-grade neural foraminal narrowing on the right at C3-C4, C4-C5 and C5-C6 with likely   high-grade neural foraminal narrowing on the left at C4-C5 and  C5-C6.     The patient could return for axial imaging for more detailed exam.     IMPRESSION:   CONCLUSION:  1.  No axial images acquired secondary to pain. The patient could return for axial images for more detailed assessment.  2.  Degenerative disc disease from at least C3-C7 with posterior disc osteophyte complexes from C3-C7 with effacement of the ventral thecal sacs. Spinal stenosis most severe at C4-C5.  3.  Partial demonstration of multilevel uncovertebral and facet arthropathy. Suspect severe neural foraminal narrowing on the right at C3-C4, C4-C5 and C5-C6 and likely high-grade on the left at C4-C5 and C5-C6.    Examination  1. Left cervical radiculopathy     2. Right cervical radiculopathy     3. Decreased ROM of neck     4. Abnormal posture         Involved side: Bilateral  Posture Observation:      General sitting posture is  poor.  General standing posture is poor.  Cervical:  Moderate forward head  Moderate reversal of cervical lordosis curve  Shoulder/Thoracic complex: Mild bilateral scapular protraction   Moderately increased upper thoracic kyphosis  Reports increased R UE paresthesias with cueing to sit upright.    Cervical ROM  Date:      *Indicate scale AROM AROM AROM   Cervical Flexion 40     Cervical Extension 15 and PF      Right Left Right Left Right Left   Cervical Sidebending 10 with R UE pain/paresthesias 10 with R UE paresthesias       Cervical Rotation 50 with R UE paresthesias 55       Cervical Protraction      Cervical Retraction      Thoracic Flexion      Thoracic Extension      Thoracic Sidebending         Thoracic Rotation           B Shoulder AROM WNL t/o, except B IR: Thumb to T12. Reports end-range pain with all motion on the L UE.  Strength  Date:      Cervical Myotomes/5 Right Left Right Left Right Left   Cervical Flexion (C1-2)         Cervical Sidebending (C3)         Shoulder Elevation (C4) 5 5       Shoulder Abduction (C5) 5 5       Elbow Flexion (C6) 5 5       Elbow  "Extension (C7) 5 5       Wrist Flexion (C7)         Wrist Extension (C6)         Thumb abduction (C8) 4+ 5       Finger Abduction (T1) 4+ 5          Strength: L 100-110-115 lb, R 80-85-80 lb.    *Examination and treatment limited, as patient 25 min late to complete paperwork.    Treatment Today     TREATMENT MINUTES COMMENTS   Evaluation 17 Cervical   Self-care/ Home management     Manual therapy 8 Supine cervical distraction with two pillows: 60\" hold, 10\" rest, having patient perform alternating elbow flexion/extension to minimize UE paresthesias   Neuromuscular Re-education     Therapeutic Activity     Therapeutic Exercises 10 -Discussed diagnosis, prognosis, POC.  -Reviewed and performed HEP with handouts provided.   Gait training     Modality__________________                Total 35    Blank areas are intentional and mean the treatment did not include these items.            PT Evaluation Code: (Please list factors)  Patient History/Comorbidities: language barriers, h/o CVA with R-sided weakness  Examination: Cervical  Clinical Presentation: Stable  Clinical Decision Making: Low    Patient History/  Comorbidities Examination  (body structures and functions, activity limitations, and/or participation restrictions) Clinical Presentation Clinical Decision Making (Complexity)   No documented Comorbidities or personal factors 1-2 Elements Stable and/or uncomplicated Low   1-2 documented comorbidities or personal factor 3 Elements Evolving clinical presentation with changing characteristics Moderate   3-4 documented comorbidities or personal factors 4 or more Unstable and unpredictable High           Kendall Guo  9/19/2017  8:59 AM                      "

## 2021-06-25 NOTE — TELEPHONE ENCOUNTER
Refill Approved    Rx renewed per Medication Renewal Policy. Medication was last renewed on 5/16/18.    Tata Duran, [facility] Triage/Med Refill 3/17/2019     Requested Prescriptions   Pending Prescriptions Disp Refills     atorvastatin (LIPITOR) 40 MG tablet [Pharmacy Med Name: Atorvastatin Calcium Oral Tablet 40 MG] 30 tablet 1     Sig: Take 1 tablet by mouth daily.    Statins Refill Protocol (Hmg CoA Reductase Inhibitors) Passed - 3/13/2019  5:11 PM       Passed - PCP or prescribing provider visit in past 12 months     Last office visit with prescriber/PCP: 12/20/2018 Mian Lowery MD OR same dept: 12/20/2018 Main Lowery MD OR same specialty: 12/20/2018 Main Lowery MD  Last physical: 8/11/2016 Last MTM visit: Visit date not found   Next visit within 3 mo: Visit date not found  Next physical within 3 mo: Visit date not found  Prescriber OR PCP: Main Lowery MD  Last diagnosis associated with med order: 1. HTN (hypertension)  - atorvastatin (LIPITOR) 40 MG tablet [Pharmacy Med Name: Atorvastatin Calcium Oral Tablet 40 MG]; Take 1 tablet by mouth daily.  Dispense: 30 tablet; Refill: 1    If protocol passes may refill for 12 months if within 3 months of last provider visit (or a total of 15 months).

## 2021-06-26 ENCOUNTER — HEALTH MAINTENANCE LETTER (OUTPATIENT)
Age: 66
End: 2021-06-26

## 2021-07-03 NOTE — ADDENDUM NOTE
Addendum Note by Main Larsen MD at 8/18/2017  7:38 AM     Author: Main Larsen MD Service: -- Author Type: Physician    Filed: 8/18/2017  7:38 AM Encounter Date: 8/3/2017 Status: Signed    : Main Larsen MD (Physician)    Addended by: MAIN LARSEN on: 8/18/2017 07:38 AM        Modules accepted: Orders

## 2021-10-16 ENCOUNTER — HEALTH MAINTENANCE LETTER (OUTPATIENT)
Age: 66
End: 2021-10-16

## 2022-02-05 ENCOUNTER — HEALTH MAINTENANCE LETTER (OUTPATIENT)
Age: 67
End: 2022-02-05

## 2022-07-17 ENCOUNTER — HEALTH MAINTENANCE LETTER (OUTPATIENT)
Age: 67
End: 2022-07-17

## 2022-09-25 ENCOUNTER — HEALTH MAINTENANCE LETTER (OUTPATIENT)
Age: 67
End: 2022-09-25

## 2023-05-13 ENCOUNTER — HEALTH MAINTENANCE LETTER (OUTPATIENT)
Age: 68
End: 2023-05-13

## 2023-08-06 ENCOUNTER — HEALTH MAINTENANCE LETTER (OUTPATIENT)
Age: 68
End: 2023-08-06

## 2023-12-24 ENCOUNTER — HEALTH MAINTENANCE LETTER (OUTPATIENT)
Age: 68
End: 2023-12-24